# Patient Record
Sex: MALE | Race: WHITE | NOT HISPANIC OR LATINO | Employment: OTHER | URBAN - METROPOLITAN AREA
[De-identification: names, ages, dates, MRNs, and addresses within clinical notes are randomized per-mention and may not be internally consistent; named-entity substitution may affect disease eponyms.]

---

## 2017-12-26 ENCOUNTER — OFFICE VISIT (OUTPATIENT)
Dept: MEDICAL GROUP | Facility: MEDICAL CENTER | Age: 57
End: 2017-12-26
Attending: FAMILY MEDICINE
Payer: MEDICAID

## 2017-12-26 VITALS
HEART RATE: 95 BPM | OXYGEN SATURATION: 96 % | BODY MASS INDEX: 24.65 KG/M2 | HEIGHT: 72 IN | TEMPERATURE: 97.6 F | SYSTOLIC BLOOD PRESSURE: 142 MMHG | DIASTOLIC BLOOD PRESSURE: 104 MMHG | WEIGHT: 182 LBS | RESPIRATION RATE: 18 BRPM

## 2017-12-26 DIAGNOSIS — Z12.11 COLON CANCER SCREENING: ICD-10-CM

## 2017-12-26 DIAGNOSIS — R53.83 OTHER FATIGUE: ICD-10-CM

## 2017-12-26 DIAGNOSIS — R21 RASH: ICD-10-CM

## 2017-12-26 DIAGNOSIS — I10 HYPERTENSION, UNSPECIFIED TYPE: ICD-10-CM

## 2017-12-26 DIAGNOSIS — Z90.79 HISTORY OF ORCHIECTOMY: ICD-10-CM

## 2017-12-26 DIAGNOSIS — Z86.19 HISTORY OF HEPATITIS C: ICD-10-CM

## 2017-12-26 PROCEDURE — 99204 OFFICE O/P NEW MOD 45 MIN: CPT | Performed by: FAMILY MEDICINE

## 2017-12-26 PROCEDURE — 99202 OFFICE O/P NEW SF 15 MIN: CPT | Performed by: FAMILY MEDICINE

## 2017-12-26 RX ORDER — BENZOCAINE/MENTHOL 6 MG-10 MG
LOZENGE MUCOUS MEMBRANE
Qty: 1 TUBE | Refills: 1 | Status: SHIPPED | OUTPATIENT
Start: 2017-12-26 | End: 2019-02-04

## 2017-12-26 RX ORDER — LISINOPRIL 20 MG/1
20 TABLET ORAL DAILY
Qty: 30 TAB | Refills: 6 | Status: SHIPPED | OUTPATIENT
Start: 2017-12-26 | End: 2020-05-01

## 2017-12-26 ASSESSMENT — PATIENT HEALTH QUESTIONNAIRE - PHQ9: CLINICAL INTERPRETATION OF PHQ2 SCORE: 0

## 2017-12-26 NOTE — PROGRESS NOTES
CC: Establish care    HPI:  New patient   Shravan presents today To establish care with PCP, 57 years old male with past medical history significant for hypertension, hepatitis C, history of unilateral orchiectomy for benign testicular tumor, patient did not have PCP for long time because he was in halfway, as part of his establishing care visit reviewed with the patient his past medical problems. Past surgical history, family/social history and medications, he lives with 2 friends/roommates, not working at this time. Did not take his medication for at least 6 months now      Hypertension, unspecified type  Blood pressure is elevated today 142/104. Patient said he feels headache occasionally and he has not been taking his medication because he ran out of them after discharge from halfway. He used to take lisinopril 20 mg daily. Denies chest pain or shortness of breath or lower extremity edema. Requesting refill of his medication to control his blood pressure today    History of hepatitis C   History of hep C, he said he was diagnosed couple of years ago, never got treatment. Would like to explore his options. Denies abdominal pain or jaundice or GI symptoms.    Other fatigue   Reports low energy, fatigue and tiredness without unintentional weight loss or night sweats or fever. Patient said since he was discharged from halfway. He just feels low energy, would like me to check his testosterone because he said he has the history of one testicle removed    History of orchiectomy   Patient with history of benign testicular chronic tumor. He said he has history of orchiectomy that was the long time ago. Would like me to check his testosterone level    Colon cancer screening  No change in bowel habits or abdominal pain     Rash   Reports this rash on his neck area on the right side for the past one month and a half. Patient said it's itchy without pain or burning sensation, no history of shingles. She has been using most  sterilization only without help. Does not remember the name      Patient Active Problem List    Diagnosis Date Noted   • History of orchiectomy 12/26/2017   • History of hepatitis C 12/26/2017   • Hypertension 12/26/2017       Current Outpatient Prescriptions   Medication Sig Dispense Refill   • lisinopril (PRINIVIL) 20 MG Tab Take 1 Tab by mouth every day. 30 Tab 6   • hydrocortisone 1 % Cream As directed 1 Tube 1     No current facility-administered medications for this visit.          Allergies as of 12/26/2017 - Reviewed 12/26/2017   Allergen Reaction Noted   • Bactrim [sulfamethoxazole w-trimethoprim] Swelling 12/26/2017        ROS: Denies any chest pain, Shortness of breath, Changes bowel or bladder, Lower extremity edema.    Physical Exam:  Gen.: Well-developed, well-nourished, no apparent distress,pleasant and cooperative with the examination  Skin:  Warm and dry with good turgor.Rash on the right side of the neck distributed along the dermatome of the right side, not tender. Denies burning sensation or pain. Described as erythematous, raised overlying on his right side of the neck. Patient reports it's very itchy     Eye: PERRLA, conjunctiva and sclera clear, lids normal  HEENT: Normocephalic/atraumatic, sinuses nontender with palpation, TMs clear, nares patent with pink mucosa and clear rhinorrhea, lips without lesions, oropharynx clear.  Neck: Trachea midline,no masses or adenopathy  Thyroid: normal consistency and size. No masses or nodules. Not tender with palpation.  Cor: Regular rate and rhythm without murmur, gallop or rub.  Lungs: Respirations unlabored.Clear to auscultation with equal breath sounds bilaterally. No wheezes, rhonchi.  Abdomen: Soft nontender without hepatosplenomegaly or masses appreciated, normoactive bowel sounds. No hernias.  Extremities: No cyanosis, clubbing or edema, Symmetrical without deformities or malformations. Pulses 2+ and symmetrical both upper and lower  extremities  Lymphatic: No abnormal adenopathy of the neck groin or axillae.  Psych: Alert and oriented x 3.Normal affect, judgement,insight and memory.        Assessment and Plan.   57 y.o. male     1. Hypertension, unspecified type  Restart on lisinopril 20 mg daily. Check lipid profile. Follow-up in 2 weeks  - LIPID PROFILE; Future  - lisinopril (PRINIVIL) 20 MG Tab; Take 1 Tab by mouth every day.  Dispense: 30 Tab; Refill: 6    2. History of hepatitis C  Check liver function test and hepatitis C antibodies, will consider referral for hep C treatment    3. Other fatigue  Will do work up to R/O DM, Anemia , Thyroid disease , Kidney disease    - CBC WITH DIFFERENTIAL; Future  - COMP METABOLIC PANEL; Future  - VITAMIN 1,25 DIHYDROXY; Future  - HEMOGLOBIN A1C; Future  - TSH; Future  - FREE THYROXINE; Future  - TESTOSTERONE, FREE AND TOTAL; Future  - VITAMIN B12; Future    4. History of orchiectomy  Check testosterone level  - TESTOSTERONE, FREE AND TOTAL; Future    5. Colon cancer screening    - OCCULT BLOOD FECES IMMUNOASSAY; Future    6. Rash  Unlikely related to shingles because of absence of tenderness or pain or burning sensation, possibly allergic in origin. Advised to use hydrocortisone cream and follow-up with me if not improved.  - hydrocortisone 1 % Cream; As directed  Dispense: 1 Tube; Refill: 1      Please note that this dictation was created using voice recognition software. I have made every reasonable attempt to correct obvious errors but there may be errors of grammar and content that I may have overlooked prior to finalization of this note.

## 2019-02-04 DIAGNOSIS — Z01.810 PRE-OPERATIVE CARDIOVASCULAR EXAMINATION: ICD-10-CM

## 2019-02-04 DIAGNOSIS — Z01.812 PRE-OPERATIVE LABORATORY EXAMINATION: ICD-10-CM

## 2019-02-04 LAB
ANION GAP SERPL CALC-SCNC: 6 MMOL/L (ref 0–11.9)
BASOPHILS # BLD AUTO: 0.6 % (ref 0–1.8)
BASOPHILS # BLD: 0.04 K/UL (ref 0–0.12)
BUN SERPL-MCNC: 25 MG/DL (ref 8–22)
CALCIUM SERPL-MCNC: 9.2 MG/DL (ref 8.5–10.5)
CHLORIDE SERPL-SCNC: 105 MMOL/L (ref 96–112)
CO2 SERPL-SCNC: 28 MMOL/L (ref 20–33)
CREAT SERPL-MCNC: 0.97 MG/DL (ref 0.5–1.4)
EKG IMPRESSION: NORMAL
EOSINOPHIL # BLD AUTO: 0.31 K/UL (ref 0–0.51)
EOSINOPHIL NFR BLD: 4.7 % (ref 0–6.9)
ERYTHROCYTE [DISTWIDTH] IN BLOOD BY AUTOMATED COUNT: 44.1 FL (ref 35.9–50)
GLUCOSE SERPL-MCNC: 127 MG/DL (ref 65–99)
HCT VFR BLD AUTO: 40.7 % (ref 42–52)
HGB BLD-MCNC: 13.9 G/DL (ref 14–18)
IMM GRANULOCYTES # BLD AUTO: 0.03 K/UL (ref 0–0.11)
IMM GRANULOCYTES NFR BLD AUTO: 0.5 % (ref 0–0.9)
LYMPHOCYTES # BLD AUTO: 2.38 K/UL (ref 1–4.8)
LYMPHOCYTES NFR BLD: 36 % (ref 22–41)
MCH RBC QN AUTO: 33.3 PG (ref 27–33)
MCHC RBC AUTO-ENTMCNC: 34.2 G/DL (ref 33.7–35.3)
MCV RBC AUTO: 97.4 FL (ref 81.4–97.8)
MONOCYTES # BLD AUTO: 0.71 K/UL (ref 0–0.85)
MONOCYTES NFR BLD AUTO: 10.7 % (ref 0–13.4)
NEUTROPHILS # BLD AUTO: 3.14 K/UL (ref 1.82–7.42)
NEUTROPHILS NFR BLD: 47.5 % (ref 44–72)
NRBC # BLD AUTO: 0 K/UL
NRBC BLD-RTO: 0 /100 WBC
PLATELET # BLD AUTO: 212 K/UL (ref 164–446)
PMV BLD AUTO: 8.8 FL (ref 9–12.9)
POTASSIUM SERPL-SCNC: 4.2 MMOL/L (ref 3.6–5.5)
RBC # BLD AUTO: 4.18 M/UL (ref 4.7–6.1)
SODIUM SERPL-SCNC: 139 MMOL/L (ref 135–145)
WBC # BLD AUTO: 6.6 K/UL (ref 4.8–10.8)

## 2019-02-04 PROCEDURE — 80048 BASIC METABOLIC PNL TOTAL CA: CPT

## 2019-02-04 PROCEDURE — 85025 COMPLETE CBC W/AUTO DIFF WBC: CPT

## 2019-02-04 PROCEDURE — 93010 ELECTROCARDIOGRAM REPORT: CPT | Performed by: INTERNAL MEDICINE

## 2019-02-04 PROCEDURE — 36415 COLL VENOUS BLD VENIPUNCTURE: CPT

## 2019-02-04 PROCEDURE — 93005 ELECTROCARDIOGRAM TRACING: CPT

## 2019-02-05 ENCOUNTER — HOSPITAL ENCOUNTER (OUTPATIENT)
Facility: MEDICAL CENTER | Age: 59
End: 2019-02-05
Attending: SURGERY | Admitting: SURGERY
Payer: MEDICAID

## 2019-02-05 VITALS
RESPIRATION RATE: 16 BRPM | HEART RATE: 69 BPM | WEIGHT: 179.01 LBS | OXYGEN SATURATION: 97 % | BODY MASS INDEX: 24.25 KG/M2 | HEIGHT: 72 IN | TEMPERATURE: 98.2 F

## 2019-02-05 DIAGNOSIS — K40.90 NON-RECURRENT UNILATERAL INGUINAL HERNIA WITHOUT OBSTRUCTION OR GANGRENE: ICD-10-CM

## 2019-02-05 PROCEDURE — 160046 HCHG PACU - 1ST 60 MINS PHASE II: Performed by: SURGERY

## 2019-02-05 PROCEDURE — 160036 HCHG PACU - EA ADDL 30 MINS PHASE I: Performed by: SURGERY

## 2019-02-05 PROCEDURE — A9270 NON-COVERED ITEM OR SERVICE: HCPCS | Performed by: ANESTHESIOLOGY

## 2019-02-05 PROCEDURE — 500380 HCHG DRAIN, PENROSE 1/4X12: Performed by: SURGERY

## 2019-02-05 PROCEDURE — 700102 HCHG RX REV CODE 250 W/ 637 OVERRIDE(OP): Performed by: ANESTHESIOLOGY

## 2019-02-05 PROCEDURE — 160028 HCHG SURGERY MINUTES - 1ST 30 MINS LEVEL 3: Performed by: SURGERY

## 2019-02-05 PROCEDURE — A6402 STERILE GAUZE <= 16 SQ IN: HCPCS | Performed by: SURGERY

## 2019-02-05 PROCEDURE — 160009 HCHG ANES TIME/MIN: Performed by: SURGERY

## 2019-02-05 PROCEDURE — C1781 MESH (IMPLANTABLE): HCPCS | Performed by: SURGERY

## 2019-02-05 PROCEDURE — 700111 HCHG RX REV CODE 636 W/ 250 OVERRIDE (IP)

## 2019-02-05 PROCEDURE — 160025 RECOVERY II MINUTES (STATS): Performed by: SURGERY

## 2019-02-05 PROCEDURE — 160002 HCHG RECOVERY MINUTES (STAT): Performed by: SURGERY

## 2019-02-05 PROCEDURE — 160039 HCHG SURGERY MINUTES - EA ADDL 1 MIN LEVEL 3: Performed by: SURGERY

## 2019-02-05 PROCEDURE — 160047 HCHG PACU  - EA ADDL 30 MINS PHASE II: Performed by: SURGERY

## 2019-02-05 PROCEDURE — 160035 HCHG PACU - 1ST 60 MINS PHASE I: Performed by: SURGERY

## 2019-02-05 PROCEDURE — 700111 HCHG RX REV CODE 636 W/ 250 OVERRIDE (IP): Performed by: ANESTHESIOLOGY

## 2019-02-05 PROCEDURE — 501838 HCHG SUTURE GENERAL: Performed by: SURGERY

## 2019-02-05 PROCEDURE — 160048 HCHG OR STATISTICAL LEVEL 1-5: Performed by: SURGERY

## 2019-02-05 PROCEDURE — 700101 HCHG RX REV CODE 250

## 2019-02-05 DEVICE — MESH ULTRAPRO HERNIA 7.5CM - (3/BX): Type: IMPLANTABLE DEVICE | Site: ABDOMEN | Status: FUNCTIONAL

## 2019-02-05 RX ORDER — OXYCODONE HCL 5 MG/5 ML
10 SOLUTION, ORAL ORAL
Status: COMPLETED | OUTPATIENT
Start: 2019-02-05 | End: 2019-02-05

## 2019-02-05 RX ORDER — BUPIVACAINE HYDROCHLORIDE AND EPINEPHRINE 5; 5 MG/ML; UG/ML
INJECTION, SOLUTION EPIDURAL; INTRACAUDAL; PERINEURAL
Status: DISCONTINUED | OUTPATIENT
Start: 2019-02-05 | End: 2019-02-05 | Stop reason: HOSPADM

## 2019-02-05 RX ORDER — ONDANSETRON 2 MG/ML
4 INJECTION INTRAMUSCULAR; INTRAVENOUS
Status: DISCONTINUED | OUTPATIENT
Start: 2019-02-05 | End: 2019-02-05 | Stop reason: HOSPADM

## 2019-02-05 RX ORDER — IPRATROPIUM BROMIDE AND ALBUTEROL SULFATE 2.5; .5 MG/3ML; MG/3ML
3 SOLUTION RESPIRATORY (INHALATION)
Status: DISCONTINUED | OUTPATIENT
Start: 2019-02-05 | End: 2019-02-05 | Stop reason: HOSPADM

## 2019-02-05 RX ORDER — DIPHENHYDRAMINE HYDROCHLORIDE 50 MG/ML
12.5 INJECTION INTRAMUSCULAR; INTRAVENOUS
Status: DISCONTINUED | OUTPATIENT
Start: 2019-02-05 | End: 2019-02-05 | Stop reason: HOSPADM

## 2019-02-05 RX ORDER — HYDROCODONE BITARTRATE AND ACETAMINOPHEN 5; 325 MG/1; MG/1
1-2 TABLET ORAL EVERY 6 HOURS PRN
Qty: 20 TAB | Refills: 0 | Status: SHIPPED | OUTPATIENT
Start: 2019-02-05 | End: 2019-02-12

## 2019-02-05 RX ORDER — OXYCODONE HCL 5 MG/5 ML
5 SOLUTION, ORAL ORAL
Status: COMPLETED | OUTPATIENT
Start: 2019-02-05 | End: 2019-02-05

## 2019-02-05 RX ORDER — HYDROCODONE BITARTRATE AND ACETAMINOPHEN 5; 325 MG/1; MG/1
1-2 TABLET ORAL EVERY 6 HOURS PRN
Status: DISCONTINUED | OUTPATIENT
Start: 2019-02-05 | End: 2019-02-05 | Stop reason: HOSPADM

## 2019-02-05 RX ORDER — MEPERIDINE HYDROCHLORIDE 25 MG/ML
12.5 INJECTION INTRAMUSCULAR; INTRAVENOUS; SUBCUTANEOUS
Status: DISCONTINUED | OUTPATIENT
Start: 2019-02-05 | End: 2019-02-05 | Stop reason: HOSPADM

## 2019-02-05 RX ORDER — HALOPERIDOL 5 MG/ML
1 INJECTION INTRAMUSCULAR
Status: DISCONTINUED | OUTPATIENT
Start: 2019-02-05 | End: 2019-02-05 | Stop reason: HOSPADM

## 2019-02-05 RX ORDER — SODIUM CHLORIDE, SODIUM LACTATE, POTASSIUM CHLORIDE, CALCIUM CHLORIDE 600; 310; 30; 20 MG/100ML; MG/100ML; MG/100ML; MG/100ML
INJECTION, SOLUTION INTRAVENOUS ONCE
Status: COMPLETED | OUTPATIENT
Start: 2019-02-05 | End: 2019-02-05

## 2019-02-05 RX ORDER — HYDROMORPHONE HYDROCHLORIDE 1 MG/ML
0.1 INJECTION, SOLUTION INTRAMUSCULAR; INTRAVENOUS; SUBCUTANEOUS
Status: DISCONTINUED | OUTPATIENT
Start: 2019-02-05 | End: 2019-02-05 | Stop reason: HOSPADM

## 2019-02-05 RX ORDER — MIDAZOLAM HYDROCHLORIDE 1 MG/ML
1 INJECTION INTRAMUSCULAR; INTRAVENOUS
Status: DISCONTINUED | OUTPATIENT
Start: 2019-02-05 | End: 2019-02-05 | Stop reason: HOSPADM

## 2019-02-05 RX ORDER — HYDROMORPHONE HYDROCHLORIDE 1 MG/ML
0.4 INJECTION, SOLUTION INTRAMUSCULAR; INTRAVENOUS; SUBCUTANEOUS
Status: DISCONTINUED | OUTPATIENT
Start: 2019-02-05 | End: 2019-02-05 | Stop reason: HOSPADM

## 2019-02-05 RX ORDER — SODIUM CHLORIDE, SODIUM GLUCONATE, SODIUM ACETATE, POTASSIUM CHLORIDE AND MAGNESIUM CHLORIDE 526; 502; 368; 37; 30 MG/100ML; MG/100ML; MG/100ML; MG/100ML; MG/100ML
500 INJECTION, SOLUTION INTRAVENOUS CONTINUOUS
Status: DISCONTINUED | OUTPATIENT
Start: 2019-02-05 | End: 2019-02-05 | Stop reason: HOSPADM

## 2019-02-05 RX ORDER — HYDROMORPHONE HYDROCHLORIDE 1 MG/ML
0.2 INJECTION, SOLUTION INTRAMUSCULAR; INTRAVENOUS; SUBCUTANEOUS
Status: DISCONTINUED | OUTPATIENT
Start: 2019-02-05 | End: 2019-02-05 | Stop reason: HOSPADM

## 2019-02-05 RX ADMIN — SODIUM CHLORIDE, SODIUM LACTATE, POTASSIUM CHLORIDE, CALCIUM CHLORIDE: 600; 310; 30; 20 INJECTION, SOLUTION INTRAVENOUS at 11:35

## 2019-02-05 RX ADMIN — OXYCODONE HYDROCHLORIDE 10 MG: 5 SOLUTION ORAL at 15:38

## 2019-02-05 RX ADMIN — FENTANYL CITRATE 50 MCG: 50 INJECTION, SOLUTION INTRAMUSCULAR; INTRAVENOUS at 15:37

## 2019-02-05 NOTE — PROGRESS NOTES
1521-Pt in PACU from OR, report from OR RN and Dr. Gurrola. VSS. Pt on room air. Right groin dressing CDI, gauze and tegaderm. Pt denies pain. Cold pack placed to right groin.    1620-Pt sitting up in gurney, sipping on water. Pt's ride Alexandre not present in waiting room. VM left at phone number provided for Alexandre.     1633-Pt up to restroom to attempt to void and to get dressed.     1645-Pt dressed, voiding without difficulty, ready to go home. Pt's ride Alexandre not answering. Pt attempting to call other friends for a ride. Pt resting in a recliner waiting for a ride.     1710-Pt able to get a hold of an alternate ride, waiting for ride.     1735-Pt discharged home with friend Deejay. Ambulatory with steady gait.

## 2019-02-05 NOTE — OP REPORT
DATE OF OPERATION: 2/5/2019     PREOPERATIVE DIAGNOSIS: Right inguinal hernia.     POSTOPERATIVE DIAGNOSIS: Right indirect inguinal hernia.     PROCEDURE PERFORMED: Repair of right indirect inguinal hernia.     SURGEON: Jh Rowe M.D.    ASSISTANT: HOMERO Deutsch.    ANESTHESIOLOGIST: Jules Gurrola M.D..    ASA CLASSIFICATION: II.    ANESTHESIA: Laryngeal mask anesthesia.     INDICATIONS: The patient is a 59 y.o. male with a symptomatic right inguinal hernia. He  is taken to the operating room for repair.     FINDINGS: Indirect inguinal hernia.    WOUND CLASSIFICATION: Class I, Clean.     SPECIMEN: None.    ESTIMATED BLOOD LOSS: 5 mL.    PROCEDURE: Following informed consent, the patient was properly identified, taken to the operating room, and placed in a supine position where a general endotracheal anesthesia was administered. Intravenous antibiotics were administered by the anesthesiologist in the correct time interval. Sequential compression devices were employed. The correct operative site was widely prepped into a sterile field.     Marcaine 0.5% with epinephrine was used to administer an ilioinguinal nerve field block. An oblique groin incision was made. The subcutaneous tissues were divided with electrocautery. The external abdominal oblique fibers were opened. The underlying cord structures were identified, encircled with a Penrose drain, and fully mobilized.     An indirect inguinal hernia sac was identified in its anteromedial location. The hernia sack was carefully dissected free from the cord structures and reduced into the preperitoneal space. A medium Ethicon ULTRAPRO™ Hernia System mesh prosthesis was passed onto the operative field. The underlay patch was passed through the internal ring and deployed in the preperitoneal space. The outer onlay mesh was secured medially and inferiorly at the pubic tubercle and Benito's ligament with interrupted 0 Ethibond sutures. The inferior portion of  the outer onlay mesh was secured about the cord structures with an 0 Ethibond suture.    The external abdominal oblique fascia was approximated over the repair with a running 4-0 Vicryl suture. The soft tissues were approximated with interrupted 3-0 Vicryl sutures and the skin approximated with a running 4-0 Monocryl suture. Sterile dressings were applied.      The patient tolerated the procedure well. There were no apparent complications. All sponge, needle, and instrument counts were correct on two separate occasions. He was awakened, extubated, and transferred to the recovery room in satisfactory condition.   ____________________________________   Jh Rowe M.D.     DD: 2/5/2019  3:00 PM

## 2019-02-06 NOTE — DISCHARGE INSTRUCTIONS
ACTIVITY: Rest and take it easy for the first 24 hours.  A responsible adult is recommended to remain with you during that time.  It is normal to feel sleepy.  We encourage you to not do anything that requires balance, judgment or coordination.    MILD FLU-LIKE SYMPTOMS ARE NORMAL. YOU MAY EXPERIENCE GENERALIZED MUSCLE ACHES, THROAT IRRITATION, HEADACHE AND/OR SOME NAUSEA.    FOR 24 HOURS DO NOT:  Drive, operate machinery or run household appliances.  Drink beer or alcoholic beverages.   Make important decisions or sign legal documents.    SPECIAL INSTRUCTIONS:     Discharge instructions:    1. DIET: Upon discharge from the hospital you may resume your normal preoperative diet. Depending on how you are feeling and whether you have nausea or not, you may wish to stay with a bland diet for the first few days. However, you can advance this as quickly as you feel ready.    2. ACTIVITIES: After discharge from the hospital, you may resume full routine activities. However, there should be no heavy lifting (greater than 15 pounds) and no strenuous activities until after your follow-up visit. Otherwise, routine activities of daily living are acceptable.    3. DRIVING: You may drive whenever you are off pain medications and are able to perform the activities needed to drive, i.e. turning, bending, twisting, etc.    4. BATHING: You may get the wound wet at any time after leaving the hospital. You may shower, but do not submerge in a bath for at least a week. Dressings may come off after 48 hours.    5. BOWEL FUNCTION: Constipation is common after an operation, especially with pain medications. The combination of pain medication and decreased activity level can cause constipation in otherwise normal patients. If you feel this is occurring, take a laxative (Milk of Magnesia, Ex-Lax, Senokot, etc.) until the problem has resolved.    6. PAIN MEDICATION: You will be given a prescription for pain medication at discharge. Please  take these as directed. It is important to remember not to take medications on an empty stomach as this may cause nausea.    7.CALL IF YOU HAVE: (1) Fevers to more than 1010 F, (2) Unusual chest or leg pain, (3) Drainage or fluid from incision that may be foul smelling, increased tenderness or soreness at the wound or the wound edges are no longer together, redness or swelling at the incision site. Please do not hesitate to call with any other questions.     8. APPOINTMENT: Contact our office at [phone] for a follow-up appointment in 1-2 weeks following your procedure.    If you have any additional questions, please do not hesitate to call the office and speak to either myself or the physician on call.    Office address:  [address] [place] [id number]      Jh Rowe MD  [facility]  [phone]      DIET: To avoid nausea, slowly advance diet as tolerated, avoiding spicy or greasy foods for the first day.  Add more substantial food to your diet according to your physician's instructions.  Babies can be fed formula or breast milk as soon as they are hungry.  INCREASE FLUIDS AND FIBER TO AVOID CONSTIPATION.    SURGICAL DRESSING/BATHING: No bath tubs, hot tubs or swimming until cleared by your physician.     FOLLOW-UP APPOINTMENT:  A follow-up appointment should be arranged with your doctor; call to schedule.    You should CALL YOUR PHYSICIAN if you develop:  Fever greater than 101 degrees F.  Pain not relieved by medication, or persistent nausea or vomiting.  Excessive bleeding (blood soaking through dressing) or unexpected drainage from the wound.  Extreme redness or swelling around the incision site, drainage of pus or foul smelling drainage.  Inability to urinate or empty your bladder within 8 hours.  Problems with breathing or chest pain.    You should call 911 if you develop problems with breathing or chest pain.  If you are unable to contact your doctor or surgical center, you  should go to the nearest emergency room or urgent care center.  Physician's telephone #: [phone].    If any questions arise, call your doctor.  If your doctor is not available, please feel free to call the Surgical Center at [phone].  The Center is open Monday through Friday from 7AM to 7PM.  You can also call the HEALTH HOTLINE open 24 hours/day, 7 days/week and speak to a nurse at [phone], or toll free at [phone].    A registered nurse may call you a few days after your surgery to see how you are doing after your procedure.    MEDICATIONS: Resume taking daily medication.  Take prescribed pain medication with food.  If no medication is prescribed, you may take non-aspirin pain medication if needed.  PAIN MEDICATION CAN BE VERY CONSTIPATING.  Take a stool softener or laxative such as senokot, pericolace, or milk of magnesia if needed.    Prescription given for Norco.  Last pain medication given at 3:38PM, next at 7:38PM.    If your physician has prescribed pain medication that includes Acetaminophen (Tylenol), do not take additional Acetaminophen (Tylenol) while taking the prescribed medication.    Depression / Suicide Risk    As you are discharged from this [facility] facility, it is important to learn how to keep safe from harming yourself.    Recognize the warning signs:  · Abrupt changes in personality, positive or negative- including increase in energy   · Giving away possessions  · Change in eating patterns- significant weight changes-  positive or negative  · Change in sleeping patterns- unable to sleep or sleeping all the time   · Unwillingness or inability to communicate  · Depression  · Unusual sadness, discouragement and loneliness  · Talk of wanting to die  · Neglect of personal appearance   · Rebelliousness- reckless behavior  · Withdrawal from people/activities they love  · Confusion- inability to concentrate     If you or a loved one observes any of these behaviors or has  concerns about self-harm, here's what you can do:  · Talk about it- your feelings and reasons for harming yourself  · Remove any means that you might use to hurt yourself (examples: pills, rope, extension cords, firearm)  · Get professional help from the community (Mental Health, Substance Abuse, psychological counseling)  · Do not be alone:Call your Safe Contact- someone whom you trust who will be there for you.  · Call your local CRISIS HOTLINE 740-5054 or 830-748-6915  · Call your local Children's Mobile Crisis Response Team [place] (056) 061-7696 or www.[employer]  · Call the toll free National Suicide Prevention Hotlines   · National Suicide Prevention Lifeline 872-249-EEYO (5758)  · National Hope Line Network 800-SUICIDE (034-2050)

## 2019-04-17 ENCOUNTER — HOSPITAL ENCOUNTER (EMERGENCY)
Dept: HOSPITAL 8 - ED | Age: 59
Discharge: HOME | End: 2019-04-17
Payer: MEDICAID

## 2019-04-17 VITALS — WEIGHT: 172.18 LBS | HEIGHT: 72 IN | BODY MASS INDEX: 23.32 KG/M2

## 2019-04-17 VITALS — SYSTOLIC BLOOD PRESSURE: 148 MMHG | DIASTOLIC BLOOD PRESSURE: 101 MMHG

## 2019-04-17 DIAGNOSIS — K52.29: Primary | ICD-10-CM

## 2019-04-17 DIAGNOSIS — I10: ICD-10-CM

## 2019-04-17 LAB
ALBUMIN SERPL-MCNC: 3.9 G/DL (ref 3.4–5)
ALP SERPL-CCNC: 72 U/L (ref 45–117)
ALT SERPL-CCNC: 46 U/L (ref 12–78)
ANION GAP SERPL CALC-SCNC: 6 MMOL/L (ref 5–15)
BASOPHILS # BLD AUTO: 0.01 X10^3/UL (ref 0–0.1)
BASOPHILS NFR BLD AUTO: 0 % (ref 0–1)
BILIRUB SERPL-MCNC: 0.6 MG/DL (ref 0.2–1)
CALCIUM SERPL-MCNC: 8.5 MG/DL (ref 8.5–10.1)
CHLORIDE SERPL-SCNC: 107 MMOL/L (ref 98–107)
CREAT SERPL-MCNC: 1.05 MG/DL (ref 0.7–1.3)
CULTURE INDICATED?: NO
EOSINOPHIL # BLD AUTO: 0.14 X10^3/UL (ref 0–0.4)
EOSINOPHIL NFR BLD AUTO: 1 % (ref 1–7)
ERYTHROCYTE [DISTWIDTH] IN BLOOD BY AUTOMATED COUNT: 12.8 % (ref 9.4–14.8)
LYMPHOCYTES # BLD AUTO: 0.54 X10^3/UL (ref 1–3.4)
LYMPHOCYTES NFR BLD AUTO: 6 % (ref 22–44)
MCH RBC QN AUTO: 32.6 PG (ref 27.5–34.5)
MCHC RBC AUTO-ENTMCNC: 33.7 G/DL (ref 33.2–36.2)
MCV RBC AUTO: 96.5 FL (ref 81–97)
MD: NO
MICROSCOPIC: (no result)
MONOCYTES # BLD AUTO: 0.48 X10^3/UL (ref 0.2–0.8)
MONOCYTES NFR BLD AUTO: 5 % (ref 2–9)
NEUTROPHILS # BLD AUTO: 8.48 X10^3/UL (ref 1.8–6.8)
NEUTROPHILS NFR BLD AUTO: 88 % (ref 42–75)
PLATELET # BLD AUTO: 206 X10^3/UL (ref 130–400)
PMV BLD AUTO: 7.2 FL (ref 7.4–10.4)
PROT SERPL-MCNC: 7.8 G/DL (ref 6.4–8.2)
RBC # BLD AUTO: 4.29 X10^6/UL (ref 4.38–5.82)

## 2019-04-17 PROCEDURE — 80053 COMPREHEN METABOLIC PANEL: CPT

## 2019-04-17 PROCEDURE — 36415 COLL VENOUS BLD VENIPUNCTURE: CPT

## 2019-04-17 PROCEDURE — 96374 THER/PROPH/DIAG INJ IV PUSH: CPT

## 2019-04-17 PROCEDURE — 83690 ASSAY OF LIPASE: CPT

## 2019-04-17 PROCEDURE — 85025 COMPLETE CBC W/AUTO DIFF WBC: CPT

## 2019-04-17 PROCEDURE — 74177 CT ABD & PELVIS W/CONTRAST: CPT

## 2019-04-17 PROCEDURE — 99284 EMERGENCY DEPT VISIT MOD MDM: CPT

## 2019-04-17 PROCEDURE — 96375 TX/PRO/DX INJ NEW DRUG ADDON: CPT

## 2019-04-17 PROCEDURE — 81003 URINALYSIS AUTO W/O SCOPE: CPT

## 2019-04-17 NOTE — NUR
SBAR REPORT FROM SÁNCHEZ HOOVER. PT AMBULATORY TO BATHROOM WITH STEADY GAIT FOR URINE 
SAMPLE. ALL OTHER CONCERNS ADRESSED.

## 2019-04-17 NOTE — NUR
Patient/Caregiver given discharge instructions and they have confirmed that 
they understand the instructions.  Patient ambulatory with steady gait. Pt left 
with all personal belongings.

## 2019-07-30 ENCOUNTER — HOSPITAL ENCOUNTER (EMERGENCY)
Dept: HOSPITAL 8 - ED | Age: 59
Discharge: HOME | End: 2019-07-30
Payer: MEDICAID

## 2019-07-30 VITALS — SYSTOLIC BLOOD PRESSURE: 166 MMHG | DIASTOLIC BLOOD PRESSURE: 95 MMHG

## 2019-07-30 VITALS — WEIGHT: 167.99 LBS | HEIGHT: 72 IN | BODY MASS INDEX: 22.75 KG/M2

## 2019-07-30 DIAGNOSIS — S00.462A: Primary | ICD-10-CM
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
## 2019-07-30 DIAGNOSIS — H60.12: ICD-10-CM

## 2019-07-30 DIAGNOSIS — I10: ICD-10-CM
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
## 2019-07-30 PROCEDURE — 99283 EMERGENCY DEPT VISIT LOW MDM: CPT

## 2020-05-01 ENCOUNTER — APPOINTMENT (OUTPATIENT)
Dept: RADIOLOGY | Facility: MEDICAL CENTER | Age: 60
DRG: 948 | End: 2020-05-01
Attending: EMERGENCY MEDICINE
Payer: MEDICAID

## 2020-05-01 ENCOUNTER — HOSPITAL ENCOUNTER (INPATIENT)
Facility: MEDICAL CENTER | Age: 60
LOS: 1 days | DRG: 948 | End: 2020-05-03
Attending: EMERGENCY MEDICINE | Admitting: INTERNAL MEDICINE
Payer: MEDICAID

## 2020-05-01 DIAGNOSIS — R79.89 ELEVATED TROPONIN: ICD-10-CM

## 2020-05-01 DIAGNOSIS — R07.9 ACUTE CHEST PAIN: ICD-10-CM

## 2020-05-01 DIAGNOSIS — F19.90 DRUG USE: ICD-10-CM

## 2020-05-01 DIAGNOSIS — R55 SYNCOPE, UNSPECIFIED SYNCOPE TYPE: ICD-10-CM

## 2020-05-01 DIAGNOSIS — R06.00 DYSPNEA, UNSPECIFIED TYPE: ICD-10-CM

## 2020-05-01 LAB
ALBUMIN SERPL BCP-MCNC: 4.1 G/DL (ref 3.2–4.9)
ALBUMIN/GLOB SERPL: 1.2 G/DL
ALP SERPL-CCNC: 67 U/L (ref 30–99)
ALT SERPL-CCNC: 31 U/L (ref 2–50)
ANION GAP SERPL CALC-SCNC: 11 MMOL/L (ref 7–16)
AST SERPL-CCNC: 37 U/L (ref 12–45)
BASOPHILS # BLD AUTO: 0.6 % (ref 0–1.8)
BASOPHILS # BLD: 0.03 K/UL (ref 0–0.12)
BILIRUB SERPL-MCNC: 0.4 MG/DL (ref 0.1–1.5)
BUN SERPL-MCNC: 25 MG/DL (ref 8–22)
CALCIUM SERPL-MCNC: 8.9 MG/DL (ref 8.5–10.5)
CHLORIDE SERPL-SCNC: 105 MMOL/L (ref 96–112)
CO2 SERPL-SCNC: 26 MMOL/L (ref 20–33)
COVID ORDER STATUS COVID19: NORMAL
CREAT SERPL-MCNC: 1.01 MG/DL (ref 0.5–1.4)
D DIMER PPP IA.FEU-MCNC: <0.27 UG/ML (FEU) (ref 0–0.5)
EOSINOPHIL # BLD AUTO: 0.34 K/UL (ref 0–0.51)
EOSINOPHIL NFR BLD: 6.5 % (ref 0–6.9)
ERYTHROCYTE [DISTWIDTH] IN BLOOD BY AUTOMATED COUNT: 42.2 FL (ref 35.9–50)
ETHANOL BLD-MCNC: <10.1 MG/DL (ref 0–10.1)
GLOBULIN SER CALC-MCNC: 3.4 G/DL (ref 1.9–3.5)
GLUCOSE SERPL-MCNC: 128 MG/DL (ref 65–99)
HCT VFR BLD AUTO: 37.9 % (ref 42–52)
HGB BLD-MCNC: 13.2 G/DL (ref 14–18)
IMM GRANULOCYTES # BLD AUTO: 0.02 K/UL (ref 0–0.11)
IMM GRANULOCYTES NFR BLD AUTO: 0.4 % (ref 0–0.9)
LIPASE SERPL-CCNC: 29 U/L (ref 11–82)
LYMPHOCYTES # BLD AUTO: 2 K/UL (ref 1–4.8)
LYMPHOCYTES NFR BLD: 38.3 % (ref 22–41)
MAGNESIUM SERPL-MCNC: 2.2 MG/DL (ref 1.5–2.5)
MCH RBC QN AUTO: 32.8 PG (ref 27–33)
MCHC RBC AUTO-ENTMCNC: 34.8 G/DL (ref 33.7–35.3)
MCV RBC AUTO: 94.3 FL (ref 81.4–97.8)
MONOCYTES # BLD AUTO: 0.47 K/UL (ref 0–0.85)
MONOCYTES NFR BLD AUTO: 9 % (ref 0–13.4)
NEUTROPHILS # BLD AUTO: 2.36 K/UL (ref 1.82–7.42)
NEUTROPHILS NFR BLD: 45.2 % (ref 44–72)
NRBC # BLD AUTO: 0 K/UL
NRBC BLD-RTO: 0 /100 WBC
PLATELET # BLD AUTO: 192 K/UL (ref 164–446)
PMV BLD AUTO: 8.8 FL (ref 9–12.9)
POTASSIUM SERPL-SCNC: 4 MMOL/L (ref 3.6–5.5)
PROT SERPL-MCNC: 7.5 G/DL (ref 6–8.2)
RBC # BLD AUTO: 4.02 M/UL (ref 4.7–6.1)
SODIUM SERPL-SCNC: 142 MMOL/L (ref 135–145)
TROPONIN T SERPL-MCNC: 24 NG/L (ref 6–19)
WBC # BLD AUTO: 5.2 K/UL (ref 4.8–10.8)

## 2020-05-01 PROCEDURE — 71045 X-RAY EXAM CHEST 1 VIEW: CPT

## 2020-05-01 PROCEDURE — 99285 EMERGENCY DEPT VISIT HI MDM: CPT

## 2020-05-01 PROCEDURE — 85379 FIBRIN DEGRADATION QUANT: CPT

## 2020-05-01 PROCEDURE — 83690 ASSAY OF LIPASE: CPT

## 2020-05-01 PROCEDURE — 83735 ASSAY OF MAGNESIUM: CPT

## 2020-05-01 PROCEDURE — G2023 SPECIMEN COLLECT COVID-19: HCPCS | Performed by: EMERGENCY MEDICINE

## 2020-05-01 PROCEDURE — U0004 COV-19 TEST NON-CDC HGH THRU: HCPCS

## 2020-05-01 PROCEDURE — 93005 ELECTROCARDIOGRAM TRACING: CPT | Performed by: EMERGENCY MEDICINE

## 2020-05-01 PROCEDURE — 80307 DRUG TEST PRSMV CHEM ANLYZR: CPT

## 2020-05-01 PROCEDURE — 80053 COMPREHEN METABOLIC PANEL: CPT

## 2020-05-01 PROCEDURE — 84484 ASSAY OF TROPONIN QUANT: CPT

## 2020-05-01 PROCEDURE — 85025 COMPLETE CBC W/AUTO DIFF WBC: CPT

## 2020-05-02 PROBLEM — I16.0 HYPERTENSIVE URGENCY: Status: ACTIVE | Noted: 2020-05-02

## 2020-05-02 PROBLEM — R07.9 CHEST PAIN: Status: ACTIVE | Noted: 2020-05-02

## 2020-05-02 PROBLEM — F15.10 AMPHETAMINE ABUSE (HCC): Status: ACTIVE | Noted: 2020-05-02

## 2020-05-02 PROBLEM — D64.9 NORMOCYTIC ANEMIA: Status: ACTIVE | Noted: 2020-05-02

## 2020-05-02 LAB
AMPHET UR QL SCN: POSITIVE
BARBITURATES UR QL SCN: NEGATIVE
BENZODIAZ UR QL SCN: NEGATIVE
BZE UR QL SCN: NEGATIVE
CANNABINOIDS UR QL SCN: NEGATIVE
EKG IMPRESSION: NORMAL
METHADONE UR QL SCN: NEGATIVE
OPIATES UR QL SCN: POSITIVE
OXYCODONE UR QL SCN: NEGATIVE
PCP UR QL SCN: NEGATIVE
PROCALCITONIN SERPL-MCNC: 0.13 NG/ML
PROPOXYPH UR QL SCN: NEGATIVE
SARS-COV-2 RNA RESP QL NAA+PROBE: NOTDETECTED
SPECIMEN SOURCE: NORMAL
TROPONIN T SERPL-MCNC: 36 NG/L (ref 6–19)

## 2020-05-02 PROCEDURE — 770020 HCHG ROOM/CARE - TELE (206)

## 2020-05-02 PROCEDURE — 700105 HCHG RX REV CODE 258: Performed by: HOSPITALIST

## 2020-05-02 PROCEDURE — 84145 PROCALCITONIN (PCT): CPT

## 2020-05-02 PROCEDURE — 99220 PR INITIAL OBSERVATION CARE,LEVL III: CPT | Performed by: HOSPITALIST

## 2020-05-02 PROCEDURE — 84484 ASSAY OF TROPONIN QUANT: CPT

## 2020-05-02 PROCEDURE — A9270 NON-COVERED ITEM OR SERVICE: HCPCS | Performed by: HOSPITALIST

## 2020-05-02 PROCEDURE — 700111 HCHG RX REV CODE 636 W/ 250 OVERRIDE (IP): Performed by: HOSPITALIST

## 2020-05-02 PROCEDURE — 700102 HCHG RX REV CODE 250 W/ 637 OVERRIDE(OP): Performed by: HOSPITALIST

## 2020-05-02 PROCEDURE — 36415 COLL VENOUS BLD VENIPUNCTURE: CPT

## 2020-05-02 PROCEDURE — 80307 DRUG TEST PRSMV CHEM ANLYZR: CPT

## 2020-05-02 RX ORDER — ONDANSETRON 2 MG/ML
4 INJECTION INTRAMUSCULAR; INTRAVENOUS EVERY 4 HOURS PRN
Status: DISCONTINUED | OUTPATIENT
Start: 2020-05-02 | End: 2020-05-03 | Stop reason: HOSPADM

## 2020-05-02 RX ORDER — SODIUM CHLORIDE, SODIUM LACTATE, POTASSIUM CHLORIDE, CALCIUM CHLORIDE 600; 310; 30; 20 MG/100ML; MG/100ML; MG/100ML; MG/100ML
INJECTION, SOLUTION INTRAVENOUS CONTINUOUS
Status: DISCONTINUED | OUTPATIENT
Start: 2020-05-02 | End: 2020-05-03 | Stop reason: HOSPADM

## 2020-05-02 RX ORDER — CLONIDINE HYDROCHLORIDE 0.1 MG/1
0.1 TABLET ORAL EVERY 6 HOURS PRN
Status: DISCONTINUED | OUTPATIENT
Start: 2020-05-02 | End: 2020-05-03 | Stop reason: HOSPADM

## 2020-05-02 RX ORDER — ONDANSETRON 4 MG/1
4 TABLET, ORALLY DISINTEGRATING ORAL EVERY 4 HOURS PRN
Status: DISCONTINUED | OUTPATIENT
Start: 2020-05-02 | End: 2020-05-03 | Stop reason: HOSPADM

## 2020-05-02 RX ORDER — MORPHINE SULFATE 4 MG/ML
2 INJECTION, SOLUTION INTRAMUSCULAR; INTRAVENOUS
Status: DISCONTINUED | OUTPATIENT
Start: 2020-05-02 | End: 2020-05-03 | Stop reason: HOSPADM

## 2020-05-02 RX ORDER — OXYCODONE HYDROCHLORIDE 5 MG/1
2.5 TABLET ORAL
Status: DISCONTINUED | OUTPATIENT
Start: 2020-05-02 | End: 2020-05-03 | Stop reason: HOSPADM

## 2020-05-02 RX ORDER — PROCHLORPERAZINE EDISYLATE 5 MG/ML
5-10 INJECTION INTRAMUSCULAR; INTRAVENOUS EVERY 4 HOURS PRN
Status: DISCONTINUED | OUTPATIENT
Start: 2020-05-02 | End: 2020-05-03 | Stop reason: HOSPADM

## 2020-05-02 RX ORDER — PROMETHAZINE HYDROCHLORIDE 25 MG/1
12.5-25 TABLET ORAL EVERY 4 HOURS PRN
Status: DISCONTINUED | OUTPATIENT
Start: 2020-05-02 | End: 2020-05-03 | Stop reason: HOSPADM

## 2020-05-02 RX ORDER — ASPIRIN 300 MG/1
300 SUPPOSITORY RECTAL DAILY
Status: DISCONTINUED | OUTPATIENT
Start: 2020-05-02 | End: 2020-05-03 | Stop reason: HOSPADM

## 2020-05-02 RX ORDER — PROMETHAZINE HYDROCHLORIDE 25 MG/1
12.5-25 SUPPOSITORY RECTAL EVERY 4 HOURS PRN
Status: DISCONTINUED | OUTPATIENT
Start: 2020-05-02 | End: 2020-05-03 | Stop reason: HOSPADM

## 2020-05-02 RX ORDER — ASPIRIN 81 MG/1
324 TABLET, CHEWABLE ORAL DAILY
Status: DISCONTINUED | OUTPATIENT
Start: 2020-05-02 | End: 2020-05-03 | Stop reason: HOSPADM

## 2020-05-02 RX ORDER — HYDROCHLOROTHIAZIDE 25 MG/1
25 TABLET ORAL DAILY
COMMUNITY
Start: 2020-03-04

## 2020-05-02 RX ORDER — OXYCODONE HYDROCHLORIDE 5 MG/1
5 TABLET ORAL
Status: DISCONTINUED | OUTPATIENT
Start: 2020-05-02 | End: 2020-05-03 | Stop reason: HOSPADM

## 2020-05-02 RX ORDER — POLYETHYLENE GLYCOL 3350 17 G/17G
1 POWDER, FOR SOLUTION ORAL
Status: DISCONTINUED | OUTPATIENT
Start: 2020-05-02 | End: 2020-05-03 | Stop reason: HOSPADM

## 2020-05-02 RX ORDER — AMOXICILLIN 250 MG
2 CAPSULE ORAL 2 TIMES DAILY
Status: DISCONTINUED | OUTPATIENT
Start: 2020-05-02 | End: 2020-05-03 | Stop reason: HOSPADM

## 2020-05-02 RX ORDER — LISINOPRIL 30 MG/1
30 TABLET ORAL 2 TIMES DAILY
COMMUNITY
Start: 2020-04-29

## 2020-05-02 RX ORDER — BISACODYL 10 MG
10 SUPPOSITORY, RECTAL RECTAL
Status: DISCONTINUED | OUTPATIENT
Start: 2020-05-02 | End: 2020-05-03 | Stop reason: HOSPADM

## 2020-05-02 RX ORDER — ASPIRIN 325 MG
325 TABLET ORAL DAILY
Status: DISCONTINUED | OUTPATIENT
Start: 2020-05-02 | End: 2020-05-03 | Stop reason: HOSPADM

## 2020-05-02 RX ORDER — ACETAMINOPHEN 325 MG/1
650 TABLET ORAL EVERY 6 HOURS PRN
Status: DISCONTINUED | OUTPATIENT
Start: 2020-05-02 | End: 2020-05-03 | Stop reason: HOSPADM

## 2020-05-02 RX ADMIN — ENOXAPARIN SODIUM 40 MG: 100 INJECTION SUBCUTANEOUS at 05:17

## 2020-05-02 RX ADMIN — SODIUM CHLORIDE, POTASSIUM CHLORIDE, SODIUM LACTATE AND CALCIUM CHLORIDE: 600; 310; 30; 20 INJECTION, SOLUTION INTRAVENOUS at 02:43

## 2020-05-02 RX ADMIN — SENNOSIDES AND DOCUSATE SODIUM 2 TABLET: 8.6; 5 TABLET ORAL at 16:51

## 2020-05-02 RX ADMIN — ASPIRIN 325 MG: 325 TABLET, FILM COATED ORAL at 05:17

## 2020-05-02 RX ADMIN — SENNOSIDES AND DOCUSATE SODIUM 2 TABLET: 8.6; 5 TABLET ORAL at 05:17

## 2020-05-02 RX ADMIN — CLONIDINE HYDROCHLORIDE 0.1 MG: 0.1 TABLET ORAL at 02:44

## 2020-05-02 RX ADMIN — SODIUM CHLORIDE, POTASSIUM CHLORIDE, SODIUM LACTATE AND CALCIUM CHLORIDE: 600; 310; 30; 20 INJECTION, SOLUTION INTRAVENOUS at 12:24

## 2020-05-02 RX ADMIN — SODIUM CHLORIDE, POTASSIUM CHLORIDE, SODIUM LACTATE AND CALCIUM CHLORIDE: 600; 310; 30; 20 INJECTION, SOLUTION INTRAVENOUS at 22:47

## 2020-05-02 ASSESSMENT — COGNITIVE AND FUNCTIONAL STATUS - GENERAL
STANDING UP FROM CHAIR USING ARMS: A LOT
HELP NEEDED FOR BATHING: A LITTLE
DAILY ACTIVITIY SCORE: 12
CLIMB 3 TO 5 STEPS WITH RAILING: A LOT
TOILETING: A LOT
CLIMB 3 TO 5 STEPS WITH RAILING: A LITTLE
SUGGESTED CMS G CODE MODIFIER MOBILITY: CJ
EATING MEALS: A LOT
SUGGESTED CMS G CODE MODIFIER DAILY ACTIVITY: CL
DRESSING REGULAR UPPER BODY CLOTHING: A LOT
SUGGESTED CMS G CODE MODIFIER DAILY ACTIVITY: CI
DRESSING REGULAR LOWER BODY CLOTHING: A LOT
TURNING FROM BACK TO SIDE WHILE IN FLAT BAD: A LITTLE
PERSONAL GROOMING: A LOT
WALKING IN HOSPITAL ROOM: A LOT
DAILY ACTIVITIY SCORE: 23
HELP NEEDED FOR BATHING: A LOT
WALKING IN HOSPITAL ROOM: A LITTLE
MOBILITY SCORE: 22

## 2020-05-02 ASSESSMENT — PATIENT HEALTH QUESTIONNAIRE - PHQ9
9. THOUGHTS THAT YOU WOULD BE BETTER OFF DEAD, OR OF HURTING YOURSELF: NOT AT ALL
SUM OF ALL RESPONSES TO PHQ QUESTIONS 1-9: 5
2. FEELING DOWN, DEPRESSED, IRRITABLE, OR HOPELESS: SEVERAL DAYS
1. LITTLE INTEREST OR PLEASURE IN DOING THINGS: NOT AT ALL
4. FEELING TIRED OR HAVING LITTLE ENERGY: SEVERAL DAYS
5. POOR APPETITE OR OVEREATING: NOT AT ALL
7. TROUBLE CONCENTRATING ON THINGS, SUCH AS READING THE NEWSPAPER OR WATCHING TELEVISION: MORE THAN HALF THE DAYS
6. FEELING BAD ABOUT YOURSELF - OR THAT YOU ARE A FAILURE OR HAVE LET YOURSELF OR YOUR FAMILY DOWN: SEVERAL DAYS
SUM OF ALL RESPONSES TO PHQ9 QUESTIONS 1 AND 2: 1
8. MOVING OR SPEAKING SO SLOWLY THAT OTHER PEOPLE COULD HAVE NOTICED. OR THE OPPOSITE, BEING SO FIGETY OR RESTLESS THAT YOU HAVE BEEN MOVING AROUND A LOT MORE THAN USUAL: NOT AT ALL
3. TROUBLE FALLING OR STAYING ASLEEP OR SLEEPING TOO MUCH: NOT AT ALL

## 2020-05-02 ASSESSMENT — ENCOUNTER SYMPTOMS
BACK PAIN: 1
CONSTITUTIONAL NEGATIVE: 1
NEUROLOGICAL NEGATIVE: 1
GASTROINTESTINAL NEGATIVE: 1
CARDIOVASCULAR NEGATIVE: 1
EYES NEGATIVE: 1
PSYCHIATRIC NEGATIVE: 1
RESPIRATORY NEGATIVE: 1

## 2020-05-02 ASSESSMENT — FIBROSIS 4 INDEX: FIB4 SCORE: 2.15

## 2020-05-02 ASSESSMENT — COPD QUESTIONNAIRES
COPD SCREENING SCORE: 6
HAVE YOU SMOKED AT LEAST 100 CIGARETTES IN YOUR ENTIRE LIFE: YES
IN THE PAST 12 MONTHS DO YOU DO LESS THAN YOU USED TO BECAUSE OF YOUR BREATHING PROBLEMS: DISAGREE/UNSURE
DURING THE PAST 4 WEEKS HOW MUCH DID YOU FEEL SHORT OF BREATH: SOME OF THE TIME
DO YOU EVER COUGH UP ANY MUCUS OR PHLEGM?: YES, A FEW DAYS A WEEK OR MONTH

## 2020-05-02 ASSESSMENT — LIFESTYLE VARIABLES
AVERAGE NUMBER OF DAYS PER WEEK YOU HAVE A DRINK CONTAINING ALCOHOL: 0
HOW MANY TIMES IN THE PAST YEAR HAVE YOU HAD 5 OR MORE DRINKS IN A DAY: 0
ALCOHOL_USE: NO
ON A TYPICAL DAY WHEN YOU DRINK ALCOHOL HOW MANY DRINKS DO YOU HAVE: 0
EVER HAD A DRINK FIRST THING IN THE MORNING TO STEADY YOUR NERVES TO GET RID OF A HANGOVER: NO
TOTAL SCORE: 0
HAVE YOU EVER FELT YOU SHOULD CUT DOWN ON YOUR DRINKING: NO
CONSUMPTION TOTAL: NEGATIVE
EVER_SMOKED: YES
TOTAL SCORE: 0
EVER FELT BAD OR GUILTY ABOUT YOUR DRINKING: NO
EVER_SMOKED: YES
TOTAL SCORE: 0
HAVE PEOPLE ANNOYED YOU BY CRITICIZING YOUR DRINKING: NO

## 2020-05-02 NOTE — ASSESSMENT & PLAN NOTE
Patient was using methamphetamine prior to arrival   Urine drug screen positive for opioids and amphetamines  Denies any other substance use other than Methamphetamine  Monitor

## 2020-05-02 NOTE — H&P
"Hospital Medicine History & Physical Note    Date of Service  5/2/2020    Primary Care Physician  Pcp Not In Computer    Code Status  Full Code    Chief Complaint  Chief Complaint   Patient presents with   • Shortness of Breath   • Chest Pain   • Loss of Consciousness       History of Presenting Illness  60 y.o. male with current methamphetamine abuse, apparent history of hypertension not currently taking therapy, and chronic active hepatitis C was apparently well until the evening prior to admission when while doing methamphetamines with a friend, he \"passed out\".  Thinking that the patient had cardiac arrest, the friend subsequently did chest compressions for an unclear amount of time.  EMS was called, and he was given narcan with some improvement and brought to this facility.  Currently he has limited ability to give history, falling asleep easily after 1-2 words.  He is unable to provide a full accurate review of systems.       Review of Systems  Review of Systems   Unable to perform ROS: Mental status change       Past Medical History   has a past medical history of Dental disorder, Emphysema of lung (HCC), Heart burn, Hepatitis C, Hiatus hernia syndrome, Hypertension, and Pneumonia (1998).    Surgical History   has a past surgical history that includes testicle exploration; other; and inguinal hernia repair (Right, 2/5/2019).     Family History  family history includes Diabetes in his maternal grandmother; Hypertension in his maternal grandmother and mother; Stroke in his maternal grandmother and mother.     Social History   reports that he has been smoking cigarettes. He has a 5.00 pack-year smoking history. He has never used smokeless tobacco. He reports current alcohol use. He reports current drug use. Drug: Marijuana.    Allergies  Allergies   Allergen Reactions   • Bactrim [Sulfamethoxazole W-Trimethoprim] Swelling     Lost a testicle as a result of the reaction skin peeled   • Sulfa Drugs  "       Medications  None       Physical Exam  Temp:  [36.1 °C (97 °F)] 36.1 °C (97 °F)  Pulse:  [87-93] 87  Resp:  [16] 16  BP: (189)/(114) 189/114  SpO2:  [97 %-99 %] 97 %    Physical Exam  Vitals signs and nursing note reviewed.   Constitutional:       General: He is not in acute distress.     Appearance: Normal appearance. He is not ill-appearing.   HENT:      Head: Normocephalic and atraumatic.      Nose: Nose normal.      Mouth/Throat:      Mouth: Mucous membranes are moist.      Pharynx: Oropharynx is clear. No oropharyngeal exudate or posterior oropharyngeal erythema.   Eyes:      Extraocular Movements: Extraocular movements intact.      Conjunctiva/sclera: Conjunctivae normal.      Pupils: Pupils are equal, round, and reactive to light.   Neck:      Musculoskeletal: Normal range of motion and neck supple. No muscular tenderness.   Cardiovascular:      Rate and Rhythm: Normal rate and regular rhythm.      Pulses: Normal pulses.      Heart sounds: Normal heart sounds. No murmur. No friction rub. No gallop.    Pulmonary:      Effort: Pulmonary effort is normal. No respiratory distress.      Breath sounds: Normal breath sounds. No wheezing, rhonchi or rales.   Abdominal:      General: Abdomen is flat. Bowel sounds are normal. There is no distension.      Palpations: Abdomen is soft. There is no mass.      Tenderness: There is no abdominal tenderness.   Musculoskeletal: Normal range of motion.         General: No swelling or deformity.   Lymphadenopathy:      Cervical: No cervical adenopathy.   Skin:     General: Skin is warm and dry.   Neurological:      General: No focal deficit present.      Mental Status: He is disoriented.      Cranial Nerves: No cranial nerve deficit.      Motor: No weakness.      Gait: Gait normal.      Comments: tremulous   Psychiatric:         Mood and Affect: Mood normal.         Behavior: Behavior normal.         Laboratory:  Recent Labs     05/01/20  2250   WBC 5.2   RBC 4.02*    HEMOGLOBIN 13.2*   HEMATOCRIT 37.9*   MCV 94.3   MCH 32.8   MCHC 34.8   RDW 42.2   PLATELETCT 192   MPV 8.8*     Recent Labs     05/01/20 2250   SODIUM 142   POTASSIUM 4.0   CHLORIDE 105   CO2 26   GLUCOSE 128*   BUN 25*   CREATININE 1.01   CALCIUM 8.9     Recent Labs     05/01/20 2250   ALTSGPT 31   ASTSGOT 37   ALKPHOSPHAT 67   TBILIRUBIN 0.4   LIPASE 29   GLUCOSE 128*         No results for input(s): NTPROBNP in the last 72 hours.      Recent Labs     05/01/20  2250   TROPONINT 24*       Imaging:  DX-CHEST-PORTABLE (1 VIEW)   Final Result      1.  Asymmetric interstitial opacities lower lobe, right greater than left, which may represent asymmetric interstitial edema or pneumonia.      2.  No lobar consolidation or pleural effusion.               Assessment/Plan:      * Chest pain  Assessment & Plan  Atypical, with indeterminately elevated troponin.  This in the setting of friend performing CPR.  Plan to trend troponin, if continued elevation may need cardiology consultation.      Hypertensive urgency  Assessment & Plan  Suspect consequence of methamphetamine intoxication.  Monitor.      Normocytic anemia  Assessment & Plan  Suspect this is chronic with no current evidence of bleed. Transfuse if needed for hemoglobin less than 7 gm/dL      Amphetamine abuse (HCC)  Assessment & Plan  Ongoing and just prior to arrival.

## 2020-05-02 NOTE — PROGRESS NOTES
This nurse spoke with dr. Rae regarding the pt's BP of 166/104 post prn blood pressure meds. No new orders received at this time. Will cont to monitor.

## 2020-05-02 NOTE — ED TRIAGE NOTES
Chief Complaint   Patient presents with   • Shortness of Breath   • Chest Pain   • Loss of Consciousness       BIBA from friend's house.     Pt found down by friend who performed CPR. +LOC for 20 minutes.  1.5 mg narcan administered pta with some effect.  15L non rebreather placed on pt for SpO2 of 80% on RA.    AO x2 upon arrival.  Pt titrated to 6L OM at 90%.    Seen by ERP.

## 2020-05-02 NOTE — PROGRESS NOTES
"[facility] Medicine Daily Progress Note    Date of Service  5/2/2020    Chief Complaint  60 y.o. male admitted 5/1/2020 after losing consciousness while using methamphetamine with some friends. Per report, his friend thought patient was having a cardiac arrest and did chest compressions. EMS were called, patient received narcan with minimal response.     He has chronic active Hepatitis C.     Hospital Course  Troponin was 24, D-Dimer was negative, Covid-19 was negative, EKG was unremarkable. Chest X-ray reported \"asymmetric interstitial opacities lower lobe, right greater than left, which may represent asymmetric interstitial edema or pneumonia\". Blood pressure was elevated. He was afebrile with normal respiratory and heart rate. He was hypoxic on admission (71% on room air), improved to 99% with 6L/M oxygen mask. Urine drug screen is positive for opioids and amphetamines.    Interval Problem Update  Stable, oxygen saturation has improved to 100% on 2L/M nasal cannula. Troponin has slightly increased 24 -> 36.     Consultants/Specialty  None    Code Status  Full Code    Disposition  Home when stable    Review of Systems  Review of Systems   Constitutional: Negative.    HENT: Negative.    Eyes: Negative.    Respiratory: Negative.    Cardiovascular: Negative.  Negative for chest pain.   Gastrointestinal: Negative.    Genitourinary: Negative.    Musculoskeletal: Positive for back pain.        Chronic back pain   Skin: Negative.    Neurological: Negative.    Endo/Heme/Allergies: Negative.    Psychiatric/Behavioral: Negative.         Physical Exam  Temp:  [36.1 °C (97 °F)-36.9 °C (98.5 °F)] 36.9 °C (98.5 °F)  Pulse:  [68-93] 68  Resp:  [14-20] 20  BP: (130-189)/() 130/89  SpO2:  [71 %-100 %] 100 %    Physical Exam  Constitutional:       Appearance: Normal appearance.   HENT:      Head: Normocephalic and atraumatic.      Nose: Nose normal.   Eyes:      Pupils: Pupils are equal, round, and reactive to light.   Neck:    "   Musculoskeletal: Normal range of motion.   Cardiovascular:      Rate and Rhythm: Normal rate.      Pulses: Normal pulses.   Pulmonary:      Effort: Pulmonary effort is normal.      Breath sounds: Normal breath sounds.   Abdominal:      Palpations: Abdomen is soft.   Musculoskeletal: Normal range of motion.      Right lower leg: No edema.      Left lower leg: No edema.   Skin:     General: Skin is warm.   Neurological:      General: No focal deficit present.      Mental Status: He is alert.   Psychiatric:         Mood and Affect: Mood normal.         Fluids    Intake/Output Summary (Last 24 hours) at 5/2/2020 1316  Last data filed at 5/2/2020 0303  Gross per 24 hour   Intake 50 ml   Output --   Net 50 ml       Laboratory  Recent Labs     05/01/20  2250   WBC 5.2   RBC 4.02*   HEMOGLOBIN 13.2*   HEMATOCRIT 37.9*   MCV 94.3   MCH 32.8   MCHC 34.8   RDW 42.2   PLATELETCT 192   MPV 8.8*     Recent Labs     05/01/20  2250   SODIUM 142   POTASSIUM 4.0   CHLORIDE 105   CO2 26   GLUCOSE 128*   BUN 25*   CREATININE 1.01   CALCIUM 8.9                   Imaging  DX-CHEST-PORTABLE (1 VIEW)   Final Result      1.  Asymmetric interstitial opacities lower lobe, right greater than left, which may represent asymmetric interstitial edema or pneumonia.      2.  No lobar consolidation or pleural effusion.              Assessment/Plan  * Chest pain  Assessment & Plan  Patient denies having chest pain, states his back was hurting  Pain was most likely from chest compressions  Troponin has slightly increased 24 -> 36, continue to trend  Elevated troponin is likely due to chest compressions  D-Dimer was normal, chest x-ray showed interstitial edema vs. Pneumonia  Afebrile, hemodynamically stable, no leukocytosis  Will check Procalcitonin  Wean oxygen as tolerated    Hypertensive urgency  Assessment & Plan  BP stabilized  Likely due to Methamphetamine intoxication  Continue PRN Clonidine  Monitor    Normocytic anemia  Assessment &  Plan  Suspected to be chronic  No obvious signs of bleeding  Monitor    Amphetamine abuse (HCC)  Assessment & Plan  Patient was using methamphetamine prior to arrival   Urine drug screen positive for opioids and amphetamines  Denies any other substance use other than Methamphetamine  Monitor       VTE prophylaxis: SCD's

## 2020-05-02 NOTE — PROGRESS NOTES
This nurse escorted pt to room 804 bed 2 via stretcher via [place]. No respiratory distress noted. No s/s of pain. Pt is very lethargic and is unable to stay awake to answer any questions. Pt is on a oxygen mask at 5l via Nasal Cannula. Pt moved from stretcher to the bed. Bed alarm on; call bell within reach. Bed alarm on. Iv intact; sl.

## 2020-05-02 NOTE — ED NOTES
Report given to Nieves DONATO. All questions answered. Pt transported via gurney with RN, monitored with oxygen.

## 2020-05-02 NOTE — ED PROVIDER NOTES
"ED Provider Note    CHIEF COMPLAINT  Chief Complaint   Patient presents with   • Shortness of Breath   • Chest Pain   • Loss of Consciousness        HPI    Primary care provider: Pcp Not In Computer   History obtained from: Patient and EMS  History limited by: None     Shravan Redd is a 60 y.o. male who presents to the ED by EMS for reported loss of consciousness.  Per EMS, patient's friend called because patient passed out and the friend performed chest compressions.  Patient does not remember what happened but believes he was smoking meth just prior to passing out.  EMS gave patient Narcan and he apparently became more responsive.  No reported injury or trauma.  Patient is reporting chest pain \"right in the middle\" along with shortness of breath.  He has had some cough recently.  He denies fever/sore throat/recent travels or known ill contacts.  He reports having nausea and vomiting today but unable to give any further details.  He reports having slight diarrhea but unable to give further details.  He denies any dysuria.  He denies any focal weakness or sensory change.  He denies suicidal or homicidal ideations.    REVIEW OF SYSTEMS  Please see HPI for pertinent positives/negatives.  All other systems reviewed and are negative.     PAST MEDICAL HISTORY  Past Medical History:   Diagnosis Date   • Pneumonia 1998   • Dental disorder    • Emphysema of lung (HCC)    • Heart burn    • Hepatitis C    • Hiatus hernia syndrome    • Hypertension     pt states bp runs high even on meds        SURGICAL HISTORY  Past Surgical History:   Procedure Laterality Date   • INGUINAL HERNIA REPAIR Right 2/5/2019    Procedure: INGUINAL HERNIA REPAIR;  Surgeon: Jh Rowe M.D.;  Location: SURGERY SAME DAY [facility];  Service: General   • OTHER      orif jaw   • TESTICLE EXPLORATION      one testicle removed        SOCIAL HISTORY  Social History     Tobacco Use   • Smoking status: Current Every Day Smoker     Packs/day: " 0.25     Years: 20.00     Pack years: 5.00     Types: Cigarettes   • Smokeless tobacco: Never Used   Substance and Sexual Activity   • Alcohol use: Yes     Comment: occ   • Drug use: Yes     Types: Marijuana     Comment: meth and marijuana   • Sexual activity: Yes     Partners: Female     Birth control/protection: Condom        FAMILY HISTORY  Family History   Problem Relation Age of Onset   • Stroke Mother    • Hypertension Mother    • Hypertension Maternal Grandmother    • Stroke Maternal Grandmother    • Diabetes Maternal Grandmother         CURRENT MEDICATIONS  Home Medications     Reviewed by Elaina Thomas R.N. (Registered Nurse) on 05/01/20 at 2228  Med List Status: Complete   Medication Last Dose Status        Patient Jens Taking any Medications                        ALLERGIES  Allergies   Allergen Reactions   • Bactrim [Sulfamethoxazole W-Trimethoprim] Swelling     Lost a testicle as a result of the reaction skin peeled   • Sulfa Drugs         PHYSICAL EXAM  VITAL SIGNS: BP (!) 187/99   Pulse 79   Temp 36.1 °C (97 °F) (Temporal)   Resp 15   Ht 1.829 m (6')   Wt 77.1 kg (170 lb)   SpO2 98%   BMI 23.06 kg/m²  @ARMOND[[id number]::@     Pulse ox interpretation: 97% I interpret this pulse ox as normal     Cardiac monitor interpretation: Sinus rhythm with heart rate in the 80s as interpreted by me.  The patient presented with syncope, chest pain and shortness of breath and cardiac monitor was ordered to monitor for dysrhythmia.    Constitutional: Well developed, well nourished, drowsy in no apparent distress, nontoxic appearance    HENT: No external signs of trauma, normocephalic, oropharynx moist and clear, nose normal    Eyes: PERRL, EOMI, vision and visual fields are grossly intact bilaterally, conjunctiva without erythema, no discharge, no icterus    Neck: Soft and supple, trachea midline, no stridor, no tenderness, no LAD, no JVD, good ROM    Cardiovascular: Regular rate and rhythm, no  murmurs/rubs/gallops, strong distal pulses and good perfusion    Thorax & Lungs: No respiratory distress, CTAB   Abdomen: Soft, nontender, nondistended, no guarding, no rebound, normal BS    Back: No CVAT    Extremities: No cyanosis, no edema, no gross deformity, good ROM, no tenderness, intact distal pulses with brisk cap refill    Skin: Warm, dry, no pallor/cyanosis, no rash noted    Lymphatic: No lymphadenopathy noted    Neuro: Drowsy and oriented to person, place, and time.  GCS 15.  CN II-XII grossly intact.  Normal speech.  Equal strength bilateral UE/LE.  Sensation intact to touch.  No cerebellar signs.  Psychiatric: Cooperative, flat affect, denies suicidal or homicidal ideations, no signs of active hallucinations or delusions      DIAGNOSTIC STUDIES / PROCEDURES    EKG  12 Lead EKG obtained at 2218 and interpreted by me:   Rate: 85   Rhythm: Sinus rhythm   Ectopy: None  Intervals: QTc 495  Axis: Normal   QRS: Normal   ST segments: Minimal ST depression in inferior and lateral leads  T Waves: Normal    Clinical Impression: Sinus rhythm with borderline QT prolongation and minimal ST depression in inferior and lateral leads  Compared to February 4, 2019 with similar ST depression in inferior and lateral leads      LABS  All labs reviewed by me.     Results for orders placed or performed during the hospital encounter of 05/01/20   CBC with Differential   Result Value Ref Range    WBC 5.2 4.8 - 10.8 K/uL    RBC 4.02 (L) 4.70 - 6.10 M/uL    Hemoglobin 13.2 (L) 14.0 - 18.0 g/dL    Hematocrit 37.9 (L) 42.0 - 52.0 %    MCV 94.3 81.4 - 97.8 fL    MCH 32.8 27.0 - 33.0 pg    MCHC 34.8 33.7 - 35.3 g/dL    RDW 42.2 35.9 - 50.0 fL    Platelet Count 192 164 - 446 K/uL    MPV 8.8 (L) 9.0 - 12.9 fL    Neutrophils-Polys 45.20 44.00 - 72.00 %    Lymphocytes 38.30 22.00 - 41.00 %    Monocytes 9.00 0.00 - 13.40 %    Eosinophils 6.50 0.00 - 6.90 %    Basophils 0.60 0.00 - 1.80 %    Immature Granulocytes 0.40 0.00 - 0.90 %     Nucleated RBC 0.00 /100 WBC    Neutrophils (Absolute) 2.36 1.82 - 7.42 K/uL    Lymphs (Absolute) 2.00 1.00 - 4.80 K/uL    Monos (Absolute) 0.47 0.00 - 0.85 K/uL    Eos (Absolute) 0.34 0.00 - 0.51 K/uL    Baso (Absolute) 0.03 0.00 - 0.12 K/uL    Immature Granulocytes (abs) 0.02 0.00 - 0.11 K/uL    NRBC (Absolute) 0.00 K/uL   Complete Metabolic Panel (CMP)   Result Value Ref Range    Sodium 142 135 - 145 mmol/L    Potassium 4.0 3.6 - 5.5 mmol/L    Chloride 105 96 - 112 mmol/L    Co2 26 20 - 33 mmol/L    Anion Gap 11.0 7.0 - 16.0    Glucose 128 (H) 65 - 99 mg/dL    Bun 25 (H) 8 - 22 mg/dL    Creatinine 1.01 0.50 - 1.40 mg/dL    Calcium 8.9 8.5 - 10.5 mg/dL    AST(SGOT) 37 12 - 45 U/L    ALT(SGPT) 31 2 - 50 U/L    Alkaline Phosphatase 67 30 - 99 U/L    Total Bilirubin 0.4 0.1 - 1.5 mg/dL    Albumin 4.1 3.2 - 4.9 g/dL    Total Protein 7.5 6.0 - 8.2 g/dL    Globulin 3.4 1.9 - 3.5 g/dL    A-G Ratio 1.2 g/dL   Troponin   Result Value Ref Range    Troponin T 24 (H) 6 - 19 ng/L   LIPASE   Result Value Ref Range    Lipase 29 11 - 82 U/L   DIAGNOSTIC ALCOHOL   Result Value Ref Range    Diagnostic Alcohol <10.1 0.0 - 10.1 mg/dL   COVID/SARS CoV-2   Result Value Ref Range    COVID Order Status Received    MAGNESIUM   Result Value Ref Range    Magnesium 2.2 1.5 - 2.5 mg/dL   D-DIMER   Result Value Ref Range    D-Dimer Screen <0.27 0.00 - 0.50 ug/mL (FEU)   SARS-CoV-2, PCR (In-House)   Result Value Ref Range    SARS-CoV-2 Source NP Swab     SARS-CoV-2 by PCR NotDetected NotDetected   ESTIMATED GFR   Result Value Ref Range    GFR If African American >60 >60 mL/min/1.73 m 2    GFR If Non African American >60 >60 mL/min/1.73 m 2   EKG   Result Value Ref Range    Report       [facility] Emergency Dept.    Test Date:  2020  Pt Name:    SONIA CIERA                Department: ER  MRN:        [id number]                      Room:        22  Gender:     Male                         Technician: [id number]  :         1960                   Requested By:LAINA HERNÁNDEZ  Order #:    [id number]                    Reading MD:    Measurements  Intervals                                Axis  Rate:       85                           P:          73  UT:         168                          QRS:        39  QRSD:       100                          T:          75  QT:         416  QTc:        495    Interpretive Statements  SINUS RHYTHM  RSR' IN V1 OR V2, PROBABLY NORMAL VARIANT  BORDERLINE PROLONGED QT INTERVAL  Compared to ECG 02/04/2019 08:43:33  No significant changes          RADIOLOGY  The radiologist's interpretation of all radiological studies have been reviewed by me.     DX-CHEST-PORTABLE (1 VIEW)   Final Result      1.  Asymmetric interstitial opacities lower lobe, right greater than left, which may represent asymmetric interstitial edema or pneumonia.      2.  No lobar consolidation or pleural effusion.                COURSE & MEDICAL DECISION MAKING  Nursing notes, VS, PMSFHx reviewed in chart.     Review of past medical records shows the patient was last admitted to this hospital February 5, 2019 for inguinal hernia repair.  No recent visits to this ED.      Differential diagnoses considered include but are not limited to: Syncope, near syncope, hypoglycemia, Sz, vasovagal episode, dysrhythmia, ACS, PE, dehydration, electrolyte abnormality, drug use      Pt risk-stratified as intermediate risk for MACE in the next 6 weeks by HEART Score: 4    HISTORY  Highly suspicious +2  Moderately suspicious +1  Slightly suspicious 0    EKG  Significant ST depression +2  Nonspecific repolarization disturbance +1  Normal 0    AGE  ? 65 +2  45-65 +1  < 45 0    RISK FACTORS  Hypercholesterolemia, HTN, DM, Cigarette smoking, positive family history, obesity  ? 3 risk factors or history of atherosclerotic disease +2  1-2 risk factors +1  No risk factors known 0    TROPONIN  ? 3× normal limit +2  1-3× normal limit +1  ? normal limit 0        0040: D/W Dr. Thorne, hospitalist, who will admit patient      History and physical exam as above.  EKG showed sinus rhythm with borderline QT prolongation and minimal ST depression in inferior and lateral leads.  However, the ST depression appears unchanged compared to prior EKG.  Chest x-ray with findings as above.  Labs showed mild anemia and slightly elevated troponin.  Elevated troponin may be due to chest compression performed by his friend after he reportedly passed out after using meth.  This may also be drug-induced or may be due to underlying cardiac etiology.  I discussed the findings with the patient and he is agreeable to admission.  Discussed with Dr. Thorne who graciously agreed to admit patient for further care.      FINAL IMPRESSION  1. Syncope, unspecified syncope type Acute   2. Acute chest pain Acute   3. Dyspnea, unspecified type Acute   4. Elevated troponin Active   5. Drug use Active          DISPOSITION  Patient will be admitted by hospitalist for further care      Electronically signed by: Irdis Ellison D.O., 5/1/2020 10:30 PM      Portions of this record were made with voice recognition software.  Despite my review, spelling/grammar/context errors may still remain.  Interpretation of this chart should be taken in this context.

## 2020-05-02 NOTE — CARE PLAN
Problem: Communication  Goal: The ability to communicate needs accurately and effectively will improve  Outcome: PROGRESSING AS EXPECTED  Note: Pt is able to communicate needs appropriately. Call light within reach.       Problem: Safety  Goal: Will remain free from injury  Outcome: PROGRESSING AS EXPECTED  Note: Fall precautions in place. Bed in lowest position. Non-skid socks in place. Personal possessions within reach. Mobility sign on door. Bed-alarm on. Call light within reach. Pt educated regarding fall prevention and states understanding.

## 2020-05-02 NOTE — ASSESSMENT & PLAN NOTE
Patient denies having chest pain, states his back was hurting  Pain was most likely from chest compressions  Troponin has slightly increased 24 -> 36, continue to trend  Elevated troponin is likely due to chest compressions  D-Dimer was normal, chest x-ray showed interstitial edema vs. Pneumonia  Afebrile, hemodynamically stable, no leukocytosis  Will check Procalcitonin  Wean oxygen as tolerated

## 2020-05-02 NOTE — CARE PLAN
Problem: Communication  Goal: The ability to communicate needs accurately and effectively will improve  Outcome: PROGRESSING SLOWER THAN EXPECTED  Note: Pt is lethargic and confused. Pt can not stay awake to answer questions.      Problem: Safety  Goal: Will remain free from injury  Outcome: PROGRESSING SLOWER THAN EXPECTED  Note: Call bell within reach; bed alarm on; bedrest; confused.

## 2020-05-02 NOTE — PROGRESS NOTES
2 RN skin check completed with Rigoberto RN:   No skin breakdown noted behind bilateral ears, elbows, or sacrum; skin is blanching and intact. Pt's bilateral heels are pink and blanching and slightly dry.

## 2020-05-03 VITALS
BODY MASS INDEX: 22.72 KG/M2 | SYSTOLIC BLOOD PRESSURE: 115 MMHG | TEMPERATURE: 98 F | WEIGHT: 167.77 LBS | RESPIRATION RATE: 18 BRPM | HEART RATE: 66 BPM | HEIGHT: 72 IN | OXYGEN SATURATION: 96 % | DIASTOLIC BLOOD PRESSURE: 74 MMHG

## 2020-05-03 PROBLEM — I16.0 HYPERTENSIVE URGENCY: Status: RESOLVED | Noted: 2020-05-02 | Resolved: 2020-05-03

## 2020-05-03 PROBLEM — R07.9 CHEST PAIN: Status: RESOLVED | Noted: 2020-05-02 | Resolved: 2020-05-03

## 2020-05-03 PROBLEM — D64.9 NORMOCYTIC ANEMIA: Status: RESOLVED | Noted: 2020-05-02 | Resolved: 2020-05-03

## 2020-05-03 LAB
ANION GAP SERPL CALC-SCNC: 11 MMOL/L (ref 7–16)
BASOPHILS # BLD AUTO: 0.3 % (ref 0–1.8)
BASOPHILS # BLD: 0.04 K/UL (ref 0–0.12)
BUN SERPL-MCNC: 17 MG/DL (ref 8–22)
CALCIUM SERPL-MCNC: 8.8 MG/DL (ref 8.5–10.5)
CHLORIDE SERPL-SCNC: 101 MMOL/L (ref 96–112)
CO2 SERPL-SCNC: 25 MMOL/L (ref 20–33)
CREAT SERPL-MCNC: 0.78 MG/DL (ref 0.5–1.4)
EOSINOPHIL # BLD AUTO: 0.3 K/UL (ref 0–0.51)
EOSINOPHIL NFR BLD: 2.5 % (ref 0–6.9)
ERYTHROCYTE [DISTWIDTH] IN BLOOD BY AUTOMATED COUNT: 40.8 FL (ref 35.9–50)
GLUCOSE SERPL-MCNC: 101 MG/DL (ref 65–99)
HCT VFR BLD AUTO: 35.4 % (ref 42–52)
HGB BLD-MCNC: 12.4 G/DL (ref 14–18)
IMM GRANULOCYTES # BLD AUTO: 0.04 K/UL (ref 0–0.11)
IMM GRANULOCYTES NFR BLD AUTO: 0.3 % (ref 0–0.9)
LYMPHOCYTES # BLD AUTO: 2.18 K/UL (ref 1–4.8)
LYMPHOCYTES NFR BLD: 18.5 % (ref 22–41)
MCH RBC QN AUTO: 32.5 PG (ref 27–33)
MCHC RBC AUTO-ENTMCNC: 35 G/DL (ref 33.7–35.3)
MCV RBC AUTO: 92.9 FL (ref 81.4–97.8)
MONOCYTES # BLD AUTO: 0.75 K/UL (ref 0–0.85)
MONOCYTES NFR BLD AUTO: 6.4 % (ref 0–13.4)
NEUTROPHILS # BLD AUTO: 8.46 K/UL (ref 1.82–7.42)
NEUTROPHILS NFR BLD: 72 % (ref 44–72)
NRBC # BLD AUTO: 0 K/UL
NRBC BLD-RTO: 0 /100 WBC
PLATELET # BLD AUTO: 176 K/UL (ref 164–446)
PMV BLD AUTO: 8.9 FL (ref 9–12.9)
POTASSIUM SERPL-SCNC: 4.3 MMOL/L (ref 3.6–5.5)
RBC # BLD AUTO: 3.81 M/UL (ref 4.7–6.1)
SODIUM SERPL-SCNC: 137 MMOL/L (ref 135–145)
TROPONIN T SERPL-MCNC: 31 NG/L (ref 6–19)
WBC # BLD AUTO: 11.8 K/UL (ref 4.8–10.8)

## 2020-05-03 PROCEDURE — 99239 HOSP IP/OBS DSCHRG MGMT >30: CPT | Performed by: INTERNAL MEDICINE

## 2020-05-03 PROCEDURE — 80048 BASIC METABOLIC PNL TOTAL CA: CPT

## 2020-05-03 PROCEDURE — 700111 HCHG RX REV CODE 636 W/ 250 OVERRIDE (IP): Performed by: HOSPITALIST

## 2020-05-03 PROCEDURE — 700102 HCHG RX REV CODE 250 W/ 637 OVERRIDE(OP): Performed by: HOSPITALIST

## 2020-05-03 PROCEDURE — 85025 COMPLETE CBC W/AUTO DIFF WBC: CPT

## 2020-05-03 PROCEDURE — A9270 NON-COVERED ITEM OR SERVICE: HCPCS | Performed by: HOSPITALIST

## 2020-05-03 PROCEDURE — 36415 COLL VENOUS BLD VENIPUNCTURE: CPT

## 2020-05-03 PROCEDURE — 84484 ASSAY OF TROPONIN QUANT: CPT

## 2020-05-03 RX ADMIN — ENOXAPARIN SODIUM 40 MG: 100 INJECTION SUBCUTANEOUS at 05:00

## 2020-05-03 RX ADMIN — ASPIRIN 325 MG: 325 TABLET, FILM COATED ORAL at 05:00

## 2020-05-03 NOTE — PROGRESS NOTES
Patient discharged home. Patient AOX 4.  IV and tele box removed. Discharge instructions provided to patient and reviewed with them. All questions answered and instructed on when to follow up with MD. Patient escorted off unit by this RN; patient discharged home to friend.

## 2020-05-03 NOTE — DISCHARGE INSTRUCTIONS
Discharge Instructions    Discharged to home by car with self. Discharged via walking, hospital escort: Yes.  Special equipment needed: Not Applicable    Be sure to schedule a follow-up appointment with your primary care doctor or any specialists as instructed.     Discharge Plan:   Diet Plan: Discussed  Activity Level: Discussed  Smoking Cessation Offered: Patient Refused  Confirmed Follow up Appointment: Patient to Call and Schedule Appointment  Confirmed Symptoms Management: Discussed  Medication Reconciliation Updated: Yes    I understand that a diet low in cholesterol, fat, and sodium is recommended for good health. Unless I have been given specific instructions below for another diet, I accept this instruction as my diet prescription.   Other diet: Regular    Special Instructions: None    · Is patient discharged on Warfarin / Coumadin?   No     Depression / Suicide Risk    As you are discharged from this [facility] facility, it is important to learn how to keep safe from harming yourself.    Recognize the warning signs:  · Abrupt changes in personality, positive or negative- including increase in energy   · Giving away possessions  · Change in eating patterns- significant weight changes-  positive or negative  · Change in sleeping patterns- unable to sleep or sleeping all the time   · Unwillingness or inability to communicate  · Depression  · Unusual sadness, discouragement and loneliness  · Talk of wanting to die  · Neglect of personal appearance   · Rebelliousness- reckless behavior  · Withdrawal from people/activities they love  · Confusion- inability to concentrate     If you or a loved one observes any of these behaviors or has concerns about self-harm, here's what you can do:  · Talk about it- your feelings and reasons for harming yourself  · Remove any means that you might use to hurt yourself (examples: pills, rope, extension cords, firearm)  · Get professional help from the community (Mental  Health, Substance Abuse, psychological counseling)  · Do not be alone:Call your Safe Contact- someone whom you trust who will be there for you.  · Call your local CRISIS HOTLINE 861-4865 or 278-868-3284  · Call your local Children's Mobile Crisis Response Team [place] (361) 457-4547 or www.TweetUp  · Call the toll free National Suicide Prevention Hotlines   · National Suicide Prevention Lifeline 387-091-GFUE (5622)  · National Hope Line Network 800-SUICIDE (188-6976)              Substance Use Disorder  Substance use disorder happens when a person's repeated use of drugs or alcohol interferes with his or her ability to be productive. This disorder can cause problems with your mental and physical health. It can affect your ability to have healthy relationships, and it can keep you from being able to meet your responsibilities at work, home, or school. It can also lead to addiction.  The most commonly abused substances include:  · Alcohol.  · Tobacco.  · Marijuana.  · Stimulants, such as cocaine and methamphetamine.  · Hallucinogens, such as LSD and PCP.  · Opioids, such as some prescription pain medicines and heroin.  What are the causes?  This condition may develop due to social, psychological, or physical reasons. Causes include:  · Stress.  · Abuse.  · Peer pressure.  · Anxiety.  · Depression.  What increases the risk?  This condition is more likely to develop in people who:  · Are stressed.  · Have been abused.  · Have a mental health disorder, such as depression.  · Are born with certain genes.  What are the signs or symptoms?  Symptoms of this condition include:  · Using the substance for longer periods of time or at a higher dosage than what is normal or intended.  · Having a lasting desire to use the substance.  · Being unable to slow down or stop your use of the substance.  · Spending an abnormal amount of time seeking the substance, using the substance, or recovering from using the  substance.  · Craving the substance.  · Substance use that:  ¨ Interferes with your work, school, or home life.  ¨ Interferes with your personal and social relationships.  ¨ Makes you give up activities that you once enjoyed or found important.  · Using the substance even though:  ¨ You know it is dangerous or bad for your health.  ¨ You know it is causing problems in your life.  · Needing more and more of the substance to get the same effect (developing tolerance).  · Experiencing physical symptoms if you do not use the substance (withdrawal).  · Using the substance to avoid withdrawal.  How is this diagnosed?  This condition may be diagnosed based on:  · Your history of substance use.  · The way in which substance abuse affects your life.  · Having at least two symptoms of substance use disorder within a 12-month period.  Your health care provider may also test your blood and urine for alcohol and drugs.  How is this treated?  This condition may be treated by:  · Stopping substance use safely. This may require taking medicines and being closely observed for several days.  · Taking part in group and individual counseling from mental health providers who help people with substance use disorder.  · Staying at a residential treatment center for several days or weeks.  · Attending daily counseling sessions at a treatment center.  · Taking medicine as told by your health care provider:  ¨ To ease symptoms and prevent complications during withdrawal.  ¨ To treat other mental health issues, such as depression or anxiety.  ¨ To block cravings by causing the same effects as the substance.  ¨ To block the effects of the substance or replace good sensations with unpleasant ones.  · Going to a support group to share your experience with others who are going through the same thing. These groups are an important part of long-term recovery for many people. They include 12-step programs like Alcoholics Anonymous and Narcotics  Anonymous.  Recovery can be a long process. Many people who undergo treatment start using the substance again after stopping. This is called a relapse. If you have a relapse, that does not mean that treatment will not work.  Follow these instructions at home:  · Take over-the-counter and prescription medicines only as told by your health care provider.  · Do not use any drugs or alcohol.  · Keep all follow-up visits as told by your health care provider. This is important. This includes continuing to work with therapists, health care providers, and support groups.  Contact a health care provider if:  · You cannot take your medicines as told.  · Your symptoms get worse.  · You have trouble resisting the urge to use drugs or alcohol.  Get help right away if:  · You have serious thoughts about hurting yourself or someone else.  · You have a relapse.  This information is not intended to replace advice given to you by your health care provider. Make sure you discuss any questions you have with your health care provider.  Document Released: 08/09/2006 Document Revised: 08/16/2017 Document Reviewed: 12/23/2015  [employer] Interactive Patient Education © 2017 [employer]        Chest Pain, Nonspecific  It is often hard to give a specific diagnosis for the cause of chest pain. There is always a chance that your pain could be related to something serious, like a heart attack or a blood clot in the lungs. You need to follow up with your caregiver for further evaluation. More lab tests or other studies such as X-rays, electrocardiography, stress testing, or cardiac imaging may be needed to find the cause of your pain.  Most of the time, nonspecific chest pain improves within 2 to 3 days with rest and mild pain medicine. For the next few days, avoid physical exertion or activities that bring on pain. Do not smoke. Avoid drinking alcohol. Call your caregiver for routine follow-up as advised.   SEEK IMMEDIATE MEDICAL CARE  IF:  · You develop increased chest pain or pain that radiates to the arm, neck, jaw, back, or abdomen.   · You develop shortness of breath, increased coughing, or you start coughing up blood.   · You have severe back or abdominal pain, nausea, or vomiting.   · You develop severe weakness, fainting, fever, or chills.   Document Released: 12/18/2006 Document Revised: 03/11/2013 Document Reviewed: 06/06/2008  [employer]® Patient Information ©2013 [employer].

## 2020-05-03 NOTE — PROGRESS NOTES
Assumed care for the pt; bedside shift report received; plan of care reviewed; sr up x2; bed alarm on; door open to view; pt is presently sitting up in bed talking to the nursing staff; no respiratory distress noted; no c/o pain.

## 2020-05-03 NOTE — DISCHARGE SUMMARY
"Discharge Summary    CHIEF COMPLAINT ON ADMISSION  Chief Complaint   Patient presents with   • Shortness of Breath   • Chest Pain   • Loss of Consciousness       Reason for Admission  EMS     Admission Date  5/1/2020    CODE STATUS  Full Code    HPI & HOSPITAL COURSE  60 y.o. male admitted 5/1/2020 after losing consciousness while using methamphetamine with some friends. Per report, his friend thought patient was having a cardiac arrest and did chest compressions. EMS were called, patient received narcan with minimal response.      He has chronic active Hepatitis C.      Troponin was 24, D-Dimer was negative, Covid-19 was negative, EKG was unremarkable. Chest X-ray reported \"asymmetric interstitial opacities lower lobe, right greater than left, which may represent asymmetric interstitial edema or pneumonia\". Blood pressure was elevated. He was afebrile with normal respiratory and heart rate. He was hypoxic on admission (71% on room air), improved to 99% with 6L/M oxygen mask. Urine drug screen was positive for opioids and amphetamines.     Patient is saturating 96% on room air. Feels comfortable. States he will completely quit Methamphetamine use. He feels ready to go home     Chest Pain  Patient denies having chest pain, states his back was hurting  Pain was most likely from chest compressions  D-Dimer was normal, chest x-ray showed interstitial edema vs. Pneumonia  Afebrile, hemodynamically stable, no leukocytosis. Procalcitonin was negative  Saturating 96% on room air    Hypertensive Urgency  BP stabilized. Continue home medications with PCP follow up in 1-2 weeks     Amphetamine abuse  Patient was using methamphetamine prior to arrival. States he will quit       Therefore, he is discharged in good and stable condition to home with close outpatient follow-up.    Physical Exam  Constitutional:       Appearance: Normal appearance.   HENT:      Head: Normocephalic and atraumatic.      Nose: Nose normal.   Eyes: "      Pupils: Pupils are equal, round, and reactive to light.   Neck:      Musculoskeletal: Normal range of motion.   Cardiovascular:      Rate and Rhythm: Normal rate.      Pulses: Normal pulses.   Pulmonary:      Effort: Pulmonary effort is normal.      Breath sounds: Normal breath sounds.   Abdominal:      Palpations: Abdomen is soft.   Musculoskeletal: Normal range of motion.      Right lower leg: No edema.      Left lower leg: No edema.   Skin:     General: Skin is warm.   Neurological:      General: No focal deficit present.      Mental Status: He is alert.   Psychiatric:         Mood and Affect: Mood normal.     Discharge Date  5/3/20    FOLLOW UP ITEMS POST DISCHARGE  PCP in 1-2 weeks    DISCHARGE DIAGNOSES  Principal Problem (Resolved):    Chest pain POA: Unknown  Active Problems:    Amphetamine abuse (HCC) POA: Unknown  Resolved Problems:    Normocytic anemia POA: Unknown    Hypertensive urgency POA: Unknown      FOLLOW UP  No future appointments.  Primary Care Physician    In 2 weeks  For post hospitalization visit      MEDICATIONS ON DISCHARGE     Medication List      CONTINUE taking these medications      Instructions   hydroCHLOROthiazide 25 MG Tabs  Commonly known as:  HYDRODIURIL   Take 25 mg by mouth every day.  Dose:  25 mg     lisinopril 30 MG tablet  Commonly known as:  PRINIVIL   Take 30 mg by mouth 2 Times a Day. TWICE DAILY  Dose:  30 mg            Allergies  Allergies   Allergen Reactions   • Bactrim [Sulfamethoxazole W-Trimethoprim] Swelling     Lost a testicle as a result of the reaction skin peeled   • Sulfa Drugs        DIET  Orders Placed This Encounter   Procedures   • Diet Order Cardiac     Standing Status:   Standing     Number of Occurrences:   1     Order Specific Question:   Diet:     Answer:   Cardiac [6]     Order Specific Question:   Miscellaneous modifications:     Answer:   No Decaf, No Caffeine(for test) [11]       ACTIVITY  As  tolerated.    CONSULTATIONS  None    PROCEDURES  None    LABORATORY  Lab Results   Component Value Date    SODIUM 137 05/03/2020    POTASSIUM 4.3 05/03/2020    CHLORIDE 101 05/03/2020    CO2 25 05/03/2020    GLUCOSE 101 (H) 05/03/2020    BUN 17 05/03/2020    CREATININE 0.78 05/03/2020        Lab Results   Component Value Date    WBC 11.8 (H) 05/03/2020    HEMOGLOBIN 12.4 (L) 05/03/2020    HEMATOCRIT 35.4 (L) 05/03/2020    PLATELETCT 176 05/03/2020        Total time of the discharge process exceeds 35 minutes.

## 2020-05-03 NOTE — PROGRESS NOTES
Assumed care of patient, bedside report received from KINGA Pickett. Updated on POC, call light within reach and fall precautions in place. Bed locked and in lowest position. Patient instructed to call for assistance before getting out of bed. All questions answered, no other needs at this time.

## 2020-05-03 NOTE — CARE PLAN
Problem: Communication  Goal: The ability to communicate needs accurately and effectively will improve  Outcome: PROGRESSING AS EXPECTED  Note: Pt is able to make his needs known; a/ox4.      Problem: Safety  Goal: Will remain free from injury  Outcome: PROGRESSING AS EXPECTED  Note: Call bell within reach; bed alarm on; sr up x2; up with one person standby assist; gait is steady.

## 2020-05-03 NOTE — CARE PLAN
Problem: Communication  Goal: The ability to communicate needs accurately and effectively will improve  Outcome: PROGRESSING AS EXPECTED   Patient is able to clearly and effectively communicate needs.     Problem: Safety  Goal: Will remain free from falls  Outcome: PROGRESSING AS EXPECTED  Bed locked and in lowest position. Bed alarm on. Treaded socks in use. Call light and belongings within reach. Patient educated to call for assistance. Patient verbalizes understanding. Hourly rounding in place.      Problem: Knowledge Deficit  Goal: Knowledge of disease process/condition, treatment plan, diagnostic tests, and medications will improve  Outcome: PROGRESSING AS EXPECTED   Discussed plan of care and medications with patient, patient verbalizes understanding.

## 2020-05-06 NOTE — DISCHARGE PLANNING
note:  Pt called in because he is looking for his wallet. Called security who [phone]

## 2021-03-15 DIAGNOSIS — Z23 NEED FOR VACCINATION: ICD-10-CM

## 2021-04-21 ENCOUNTER — APPOINTMENT (OUTPATIENT)
Dept: RADIOLOGY | Facility: MEDICAL CENTER | Age: 61
End: 2021-04-21
Attending: EMERGENCY MEDICINE
Payer: MEDICAID

## 2021-04-21 ENCOUNTER — HOSPITAL ENCOUNTER (EMERGENCY)
Facility: MEDICAL CENTER | Age: 61
End: 2021-04-21
Attending: EMERGENCY MEDICINE
Payer: MEDICAID

## 2021-04-21 VITALS
HEART RATE: 69 BPM | DIASTOLIC BLOOD PRESSURE: 95 MMHG | WEIGHT: 170 LBS | OXYGEN SATURATION: 95 % | TEMPERATURE: 95.7 F | HEIGHT: 72 IN | BODY MASS INDEX: 23.03 KG/M2 | RESPIRATION RATE: 19 BRPM | SYSTOLIC BLOOD PRESSURE: 158 MMHG

## 2021-04-21 DIAGNOSIS — R91.1 LUNG NODULE: ICD-10-CM

## 2021-04-21 DIAGNOSIS — R55 NEAR SYNCOPE: ICD-10-CM

## 2021-04-21 LAB
ALBUMIN SERPL BCP-MCNC: 4 G/DL (ref 3.2–4.9)
ALBUMIN/GLOB SERPL: 1.1 G/DL
ALP SERPL-CCNC: 74 U/L (ref 30–99)
ALT SERPL-CCNC: 33 U/L (ref 2–50)
ANION GAP SERPL CALC-SCNC: 7 MMOL/L (ref 7–16)
APTT PPP: 24 SEC (ref 24.7–36)
AST SERPL-CCNC: 38 U/L (ref 12–45)
BASOPHILS # BLD AUTO: 0.7 % (ref 0–1.8)
BASOPHILS # BLD: 0.04 K/UL (ref 0–0.12)
BILIRUB SERPL-MCNC: 0.6 MG/DL (ref 0.1–1.5)
BUN SERPL-MCNC: 20 MG/DL (ref 8–22)
CALCIUM SERPL-MCNC: 9.1 MG/DL (ref 8.5–10.5)
CHLORIDE SERPL-SCNC: 103 MMOL/L (ref 96–112)
CO2 SERPL-SCNC: 26 MMOL/L (ref 20–33)
CREAT SERPL-MCNC: 1.02 MG/DL (ref 0.5–1.4)
EKG IMPRESSION: NORMAL
EKG IMPRESSION: NORMAL
EOSINOPHIL # BLD AUTO: 0.24 K/UL (ref 0–0.51)
EOSINOPHIL NFR BLD: 4.5 % (ref 0–6.9)
ERYTHROCYTE [DISTWIDTH] IN BLOOD BY AUTOMATED COUNT: 42.4 FL (ref 35.9–50)
ETHANOL BLD-MCNC: <10.1 MG/DL (ref 0–10)
GLOBULIN SER CALC-MCNC: 3.7 G/DL (ref 1.9–3.5)
GLUCOSE SERPL-MCNC: 144 MG/DL (ref 65–99)
HCT VFR BLD AUTO: 38.6 % (ref 42–52)
HGB BLD-MCNC: 13.1 G/DL (ref 14–18)
IMM GRANULOCYTES # BLD AUTO: 0.01 K/UL (ref 0–0.11)
IMM GRANULOCYTES NFR BLD AUTO: 0.2 % (ref 0–0.9)
INR PPP: 1.01 (ref 0.87–1.13)
LACTATE BLD-SCNC: 1.3 MMOL/L (ref 0.5–2)
LIPASE SERPL-CCNC: 69 U/L (ref 11–82)
LYMPHOCYTES # BLD AUTO: 2.18 K/UL (ref 1–4.8)
LYMPHOCYTES NFR BLD: 40.4 % (ref 22–41)
MCH RBC QN AUTO: 32.7 PG (ref 27–33)
MCHC RBC AUTO-ENTMCNC: 33.9 G/DL (ref 33.7–35.3)
MCV RBC AUTO: 96.3 FL (ref 81.4–97.8)
MONOCYTES # BLD AUTO: 0.41 K/UL (ref 0–0.85)
MONOCYTES NFR BLD AUTO: 7.6 % (ref 0–13.4)
NEUTROPHILS # BLD AUTO: 2.51 K/UL (ref 1.82–7.42)
NEUTROPHILS NFR BLD: 46.6 % (ref 44–72)
NRBC # BLD AUTO: 0 K/UL
NRBC BLD-RTO: 0 /100 WBC
PLATELET # BLD AUTO: 216 K/UL (ref 164–446)
PMV BLD AUTO: 8.9 FL (ref 9–12.9)
POTASSIUM SERPL-SCNC: 3.7 MMOL/L (ref 3.6–5.5)
PROT SERPL-MCNC: 7.7 G/DL (ref 6–8.2)
PROTHROMBIN TIME: 13.6 SEC (ref 12–14.6)
RBC # BLD AUTO: 4.01 M/UL (ref 4.7–6.1)
SODIUM SERPL-SCNC: 136 MMOL/L (ref 135–145)
TROPONIN T SERPL-MCNC: 37 NG/L (ref 6–19)
TROPONIN T SERPL-MCNC: 38 NG/L (ref 6–19)
WBC # BLD AUTO: 5.4 K/UL (ref 4.8–10.8)

## 2021-04-21 PROCEDURE — 85025 COMPLETE CBC W/AUTO DIFF WBC: CPT

## 2021-04-21 PROCEDURE — 85730 THROMBOPLASTIN TIME PARTIAL: CPT

## 2021-04-21 PROCEDURE — 70450 CT HEAD/BRAIN W/O DYE: CPT

## 2021-04-21 PROCEDURE — 84484 ASSAY OF TROPONIN QUANT: CPT | Mod: 91

## 2021-04-21 PROCEDURE — 83690 ASSAY OF LIPASE: CPT

## 2021-04-21 PROCEDURE — 93005 ELECTROCARDIOGRAM TRACING: CPT

## 2021-04-21 PROCEDURE — 93005 ELECTROCARDIOGRAM TRACING: CPT | Performed by: EMERGENCY MEDICINE

## 2021-04-21 PROCEDURE — 36415 COLL VENOUS BLD VENIPUNCTURE: CPT

## 2021-04-21 PROCEDURE — 99284 EMERGENCY DEPT VISIT MOD MDM: CPT

## 2021-04-21 PROCEDURE — 700117 HCHG RX CONTRAST REV CODE 255: Performed by: EMERGENCY MEDICINE

## 2021-04-21 PROCEDURE — 83605 ASSAY OF LACTIC ACID: CPT

## 2021-04-21 PROCEDURE — 80053 COMPREHEN METABOLIC PANEL: CPT

## 2021-04-21 PROCEDURE — 74175 CTA ABDOMEN W/CONTRAST: CPT

## 2021-04-21 PROCEDURE — 85610 PROTHROMBIN TIME: CPT

## 2021-04-21 PROCEDURE — 82077 ASSAY SPEC XCP UR&BREATH IA: CPT

## 2021-04-21 RX ADMIN — IOHEXOL 100 ML: 350 INJECTION, SOLUTION INTRAVENOUS at 05:46

## 2021-04-21 ASSESSMENT — FIBROSIS 4 INDEX: FIB4 SCORE: 2.38

## 2021-04-21 NOTE — ED TRIAGE NOTES
"Chief Complaint   Patient presents with   • Nausea   • Dizziness   [facility] for the above the complaints. Pt states he's \"unsure what happened\" but that he feels nauseated and weak for over an hour. Pt received zofran en route with some relief. Pt drowsy upon arrival to ED, denies ETOH, admits to marijuana  "

## 2021-04-21 NOTE — DISCHARGE INSTRUCTIONS
Follow-up with primary care this week for reevaluation, to establish care, for medication management and close blood pressure monitoring.  Continued monitoring of lung nodules is encouraged as well.    Encourage nonalcoholic oral fluid hydration and balanced diet.    Return to the emergency department for recurrent symptoms, or for dizziness, syncope, vomiting, chest pain, shortness of breath, altered mental status, focal weakness, seizure or other new concerns.
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
## 2021-04-21 NOTE — ED NOTES
Pt provided with water and juice for PO challenge per ERP at this time. Upon successful consumption of fluids, pt to road test and may be discharged if not symptomatic

## 2021-09-14 ENCOUNTER — HOSPITAL ENCOUNTER (OUTPATIENT)
Dept: RADIOLOGY | Facility: MEDICAL CENTER | Age: 61
End: 2021-09-14
Attending: PHYSICIAN ASSISTANT
Payer: MEDICAID

## 2021-09-14 DIAGNOSIS — N23 KIDNEY PAIN: ICD-10-CM

## 2021-11-24 ENCOUNTER — HOSPITAL ENCOUNTER (EMERGENCY)
Facility: MEDICAL CENTER | Age: 61
End: 2021-11-24
Attending: EMERGENCY MEDICINE
Payer: MEDICAID

## 2021-11-24 ENCOUNTER — APPOINTMENT (OUTPATIENT)
Dept: RADIOLOGY | Facility: MEDICAL CENTER | Age: 61
End: 2021-11-24
Attending: EMERGENCY MEDICINE
Payer: MEDICAID

## 2021-11-24 VITALS
SYSTOLIC BLOOD PRESSURE: 180 MMHG | TEMPERATURE: 98.4 F | OXYGEN SATURATION: 96 % | HEART RATE: 70 BPM | WEIGHT: 170 LBS | RESPIRATION RATE: 16 BRPM | DIASTOLIC BLOOD PRESSURE: 104 MMHG | HEIGHT: 72 IN | BODY MASS INDEX: 23.03 KG/M2

## 2021-11-24 DIAGNOSIS — R19.7 DIARRHEA OF PRESUMED INFECTIOUS ORIGIN: ICD-10-CM

## 2021-11-24 DIAGNOSIS — R11.2 NON-INTRACTABLE VOMITING WITH NAUSEA, UNSPECIFIED VOMITING TYPE: ICD-10-CM

## 2021-11-24 DIAGNOSIS — E86.0 MILD DEHYDRATION: ICD-10-CM

## 2021-11-24 DIAGNOSIS — R10.9 ABDOMINAL CRAMPING: ICD-10-CM

## 2021-11-24 LAB
ALBUMIN SERPL BCP-MCNC: 4.4 G/DL (ref 3.2–4.9)
ALBUMIN/GLOB SERPL: 1.3 G/DL
ALP SERPL-CCNC: 70 U/L (ref 30–99)
ALT SERPL-CCNC: 31 U/L (ref 2–50)
ANION GAP SERPL CALC-SCNC: 10 MMOL/L (ref 7–16)
AST SERPL-CCNC: 32 U/L (ref 12–45)
BASOPHILS # BLD AUTO: 0.2 % (ref 0–1.8)
BASOPHILS # BLD: 0.02 K/UL (ref 0–0.12)
BILIRUB SERPL-MCNC: 0.4 MG/DL (ref 0.1–1.5)
BUN SERPL-MCNC: 29 MG/DL (ref 8–22)
CALCIUM SERPL-MCNC: 8.8 MG/DL (ref 8.5–10.5)
CHLORIDE SERPL-SCNC: 107 MMOL/L (ref 96–112)
CO2 SERPL-SCNC: 23 MMOL/L (ref 20–33)
CREAT SERPL-MCNC: 0.74 MG/DL (ref 0.5–1.4)
EOSINOPHIL # BLD AUTO: 0.15 K/UL (ref 0–0.51)
EOSINOPHIL NFR BLD: 1.6 % (ref 0–6.9)
ERYTHROCYTE [DISTWIDTH] IN BLOOD BY AUTOMATED COUNT: 42.5 FL (ref 35.9–50)
GLOBULIN SER CALC-MCNC: 3.4 G/DL (ref 1.9–3.5)
GLUCOSE SERPL-MCNC: 107 MG/DL (ref 65–99)
HCT VFR BLD AUTO: 38.7 % (ref 42–52)
HGB BLD-MCNC: 13 G/DL (ref 14–18)
IMM GRANULOCYTES # BLD AUTO: 0.03 K/UL (ref 0–0.11)
IMM GRANULOCYTES NFR BLD AUTO: 0.3 % (ref 0–0.9)
LIPASE SERPL-CCNC: 23 U/L (ref 11–82)
LYMPHOCYTES # BLD AUTO: 1.53 K/UL (ref 1–4.8)
LYMPHOCYTES NFR BLD: 16.4 % (ref 22–41)
MCH RBC QN AUTO: 31.7 PG (ref 27–33)
MCHC RBC AUTO-ENTMCNC: 33.6 G/DL (ref 33.7–35.3)
MCV RBC AUTO: 94.4 FL (ref 81.4–97.8)
MONOCYTES # BLD AUTO: 0.65 K/UL (ref 0–0.85)
MONOCYTES NFR BLD AUTO: 7 % (ref 0–13.4)
NEUTROPHILS # BLD AUTO: 6.93 K/UL (ref 1.82–7.42)
NEUTROPHILS NFR BLD: 74.5 % (ref 44–72)
NRBC # BLD AUTO: 0 K/UL
NRBC BLD-RTO: 0 /100 WBC
PLATELET # BLD AUTO: 219 K/UL (ref 164–446)
PMV BLD AUTO: 8.7 FL (ref 9–12.9)
POTASSIUM SERPL-SCNC: 4.1 MMOL/L (ref 3.6–5.5)
PROT SERPL-MCNC: 7.8 G/DL (ref 6–8.2)
RBC # BLD AUTO: 4.1 M/UL (ref 4.7–6.1)
SODIUM SERPL-SCNC: 140 MMOL/L (ref 135–145)
WBC # BLD AUTO: 9.3 K/UL (ref 4.8–10.8)

## 2021-11-24 PROCEDURE — 83690 ASSAY OF LIPASE: CPT

## 2021-11-24 PROCEDURE — 96372 THER/PROPH/DIAG INJ SC/IM: CPT

## 2021-11-24 PROCEDURE — 700117 HCHG RX CONTRAST REV CODE 255: Performed by: EMERGENCY MEDICINE

## 2021-11-24 PROCEDURE — 74177 CT ABD & PELVIS W/CONTRAST: CPT

## 2021-11-24 PROCEDURE — 36415 COLL VENOUS BLD VENIPUNCTURE: CPT

## 2021-11-24 PROCEDURE — 99285 EMERGENCY DEPT VISIT HI MDM: CPT

## 2021-11-24 PROCEDURE — 85025 COMPLETE CBC W/AUTO DIFF WBC: CPT

## 2021-11-24 PROCEDURE — 80053 COMPREHEN METABOLIC PANEL: CPT

## 2021-11-24 PROCEDURE — 700111 HCHG RX REV CODE 636 W/ 250 OVERRIDE (IP): Performed by: EMERGENCY MEDICINE

## 2021-11-24 RX ORDER — LOPERAMIDE HYDROCHLORIDE 2 MG/1
2 CAPSULE ORAL 4 TIMES DAILY PRN
Qty: 30 CAPSULE | Refills: 0 | Status: SHIPPED | OUTPATIENT
Start: 2021-11-24

## 2021-11-24 RX ORDER — DICYCLOMINE HCL 20 MG
20 TABLET ORAL EVERY 6 HOURS
Qty: 14 TABLET | Refills: 0 | Status: SHIPPED | OUTPATIENT
Start: 2021-11-24

## 2021-11-24 RX ORDER — DICYCLOMINE HYDROCHLORIDE 10 MG/ML
20 INJECTION INTRAMUSCULAR ONCE
Status: COMPLETED | OUTPATIENT
Start: 2021-11-24 | End: 2021-11-24

## 2021-11-24 RX ORDER — ONDANSETRON 4 MG/1
4 TABLET, ORALLY DISINTEGRATING ORAL EVERY 6 HOURS PRN
Qty: 8 TABLET | Refills: 0 | Status: SHIPPED | OUTPATIENT
Start: 2021-11-24

## 2021-11-24 RX ADMIN — IOHEXOL 100 ML: 350 INJECTION, SOLUTION INTRAVENOUS at 02:25

## 2021-11-24 RX ADMIN — DICYCLOMINE HYDROCHLORIDE 20 MG: 20 INJECTION, SOLUTION INTRAMUSCULAR at 02:46

## 2021-11-24 ASSESSMENT — FIBROSIS 4 INDEX: FIB4 SCORE: 1.93

## 2021-11-24 NOTE — ED NOTES
Discharge instructions discussed with pt, verbalized understanding. PIV placed. All belongings with pt upon leaving unit. Pt amb with steady gait to lobby.
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
## 2021-11-24 NOTE — ED TRIAGE NOTES
"Chief Complaint   Patient presents with   • Abdominal Pain     Per EMS report, pt has had lower abdominal pain x2 days with worsening tonight. pt having diarrhea dark in color. PTA EMS gave 4mg zofran and 100mcg fentanyl. pt report when he burps it has a \"sulfer taste\". Pt reports nausea.    • Diarrhea     Pt hooked up to monitor and in gown. Chart up for ERP.  "

## 2021-12-23 ENCOUNTER — APPOINTMENT (OUTPATIENT)
Dept: RADIOLOGY | Facility: MEDICAL CENTER | Age: 61
End: 2021-12-23
Payer: MEDICAID

## 2021-12-23 ENCOUNTER — HOSPITAL ENCOUNTER (EMERGENCY)
Facility: MEDICAL CENTER | Age: 61
End: 2021-12-23
Attending: EMERGENCY MEDICINE
Payer: MEDICAID

## 2021-12-23 ENCOUNTER — APPOINTMENT (OUTPATIENT)
Dept: RADIOLOGY | Facility: MEDICAL CENTER | Age: 61
End: 2021-12-23
Attending: EMERGENCY MEDICINE
Payer: MEDICAID

## 2021-12-23 VITALS
DIASTOLIC BLOOD PRESSURE: 103 MMHG | BODY MASS INDEX: 21.94 KG/M2 | OXYGEN SATURATION: 95 % | RESPIRATION RATE: 18 BRPM | TEMPERATURE: 97 F | HEIGHT: 72 IN | HEART RATE: 99 BPM | WEIGHT: 162 LBS | SYSTOLIC BLOOD PRESSURE: 175 MMHG

## 2021-12-23 DIAGNOSIS — S41.112A ARM LACERATION, LEFT, INITIAL ENCOUNTER: ICD-10-CM

## 2021-12-23 DIAGNOSIS — T14.8XXA STAB WOUND: ICD-10-CM

## 2021-12-23 PROCEDURE — 304999 HCHG REPAIR-SIMPLE/INTERMED LEVEL 1

## 2021-12-23 PROCEDURE — 303747 HCHG EXTRA SUTURE

## 2021-12-23 PROCEDURE — 700101 HCHG RX REV CODE 250: Performed by: EMERGENCY MEDICINE

## 2021-12-23 PROCEDURE — 304217 HCHG IRRIGATION SYSTEM

## 2021-12-23 PROCEDURE — 96365 THER/PROPH/DIAG IV INF INIT: CPT

## 2021-12-23 PROCEDURE — 305949 HCHG RED TRAUMA ACT PRE-NOTIFY NO CC

## 2021-12-23 PROCEDURE — 99284 EMERGENCY DEPT VISIT MOD MDM: CPT

## 2021-12-23 PROCEDURE — 700111 HCHG RX REV CODE 636 W/ 250 OVERRIDE (IP): Performed by: EMERGENCY MEDICINE

## 2021-12-23 PROCEDURE — 0002A HCHG PFIZER COVID ADMIN 2ND DOSE: CPT

## 2021-12-23 PROCEDURE — 99284 EMERGENCY DEPT VISIT MOD MDM: CPT | Performed by: SURGERY

## 2021-12-23 PROCEDURE — 91300 HCHG RX REV CODE 636 W/ 250 OVERRIDE (IP): CPT | Performed by: EMERGENCY MEDICINE

## 2021-12-23 RX ORDER — LIDOCAINE HYDROCHLORIDE AND EPINEPHRINE 10; 10 MG/ML; UG/ML
20 INJECTION, SOLUTION INFILTRATION; PERINEURAL ONCE
Status: COMPLETED | OUTPATIENT
Start: 2021-12-23 | End: 2021-12-23

## 2021-12-23 RX ORDER — CEFAZOLIN SODIUM 2 G/100ML
INJECTION, SOLUTION INTRAVENOUS
Status: COMPLETED | OUTPATIENT
Start: 2021-12-23 | End: 2021-12-23

## 2021-12-23 RX ADMIN — RNA INGREDIENT BNT-162B2 0.3 ML: 0.23 INJECTION, SUSPENSION INTRAMUSCULAR at 14:04

## 2021-12-23 RX ADMIN — CEFAZOLIN SODIUM 2 G: 2 INJECTION, SOLUTION INTRAVENOUS at 13:34

## 2021-12-23 RX ADMIN — LIDOCAINE HYDROCHLORIDE AND EPINEPHRINE 20 ML: 10; 10 INJECTION, SOLUTION INFILTRATION; PERINEURAL at 14:00
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
## 2021-12-23 NOTE — ED NOTES
Discharge teaching and paperwork provided regarding arm laceration and all questions/concerns answered. VSS, assessment stable and PIV removed. Given information regarding home care and reasons to follow up with ED or primary MD. Patient provided with COVID booster, vaccine card & education. Patient discharged to the care of partner and ambulatory out of the ED.
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
## 2021-12-23 NOTE — DISCHARGE INSTRUCTIONS
Follow-up with primary care or ED in 48 hours for reevaluation and wound check.  Follow-up with emergency department in 7 to 10 days for suture removal.    Keep wound clean, dry and intact.  Cleanse gently with warm water, soap, pat dry.  Avoid soaking wound in water.  Apply antibiotic ointment twice daily.  Keep covered while active.    Tylenol or ibuprofen for any discomfort.    Return to the emergency department for persistent or worsening pain, swelling, paresthesias or weakness, discoloration, bleeding or other drainage, fever or other new concerns.

## 2021-12-31 ENCOUNTER — HOSPITAL ENCOUNTER (EMERGENCY)
Facility: MEDICAL CENTER | Age: 61
End: 2021-12-31
Attending: EMERGENCY MEDICINE
Payer: MEDICAID

## 2021-12-31 ENCOUNTER — APPOINTMENT (OUTPATIENT)
Dept: RADIOLOGY | Facility: MEDICAL CENTER | Age: 61
End: 2021-12-31
Attending: EMERGENCY MEDICINE
Payer: MEDICAID

## 2021-12-31 VITALS
SYSTOLIC BLOOD PRESSURE: 185 MMHG | HEART RATE: 91 BPM | RESPIRATION RATE: 17 BRPM | BODY MASS INDEX: 22.51 KG/M2 | OXYGEN SATURATION: 97 % | TEMPERATURE: 97.8 F | DIASTOLIC BLOOD PRESSURE: 117 MMHG | HEIGHT: 72 IN | WEIGHT: 166.23 LBS

## 2021-12-31 DIAGNOSIS — T14.8XXA HEMATOMA: ICD-10-CM

## 2021-12-31 DIAGNOSIS — Z48.02 ENCOUNTER FOR REMOVAL OF SUTURES: ICD-10-CM

## 2021-12-31 LAB
ALBUMIN SERPL BCP-MCNC: 4 G/DL (ref 3.2–4.9)
ALBUMIN/GLOB SERPL: 1.2 G/DL
ALP SERPL-CCNC: 74 U/L (ref 30–99)
ALT SERPL-CCNC: 32 U/L (ref 2–50)
ANION GAP SERPL CALC-SCNC: 8 MMOL/L (ref 7–16)
AST SERPL-CCNC: 41 U/L (ref 12–45)
BASOPHILS # BLD AUTO: 0.6 % (ref 0–1.8)
BASOPHILS # BLD: 0.04 K/UL (ref 0–0.12)
BILIRUB SERPL-MCNC: 0.7 MG/DL (ref 0.1–1.5)
BUN SERPL-MCNC: 16 MG/DL (ref 8–22)
CALCIUM SERPL-MCNC: 9.3 MG/DL (ref 8.5–10.5)
CHLORIDE SERPL-SCNC: 103 MMOL/L (ref 96–112)
CO2 SERPL-SCNC: 27 MMOL/L (ref 20–33)
CREAT SERPL-MCNC: 0.75 MG/DL (ref 0.5–1.4)
EOSINOPHIL # BLD AUTO: 0.47 K/UL (ref 0–0.51)
EOSINOPHIL NFR BLD: 6.9 % (ref 0–6.9)
ERYTHROCYTE [DISTWIDTH] IN BLOOD BY AUTOMATED COUNT: 44.6 FL (ref 35.9–50)
GLOBULIN SER CALC-MCNC: 3.4 G/DL (ref 1.9–3.5)
GLUCOSE SERPL-MCNC: 93 MG/DL (ref 65–99)
HCT VFR BLD AUTO: 35.5 % (ref 42–52)
HGB BLD-MCNC: 11.8 G/DL (ref 14–18)
IMM GRANULOCYTES # BLD AUTO: 0.02 K/UL (ref 0–0.11)
IMM GRANULOCYTES NFR BLD AUTO: 0.3 % (ref 0–0.9)
LYMPHOCYTES # BLD AUTO: 2.11 K/UL (ref 1–4.8)
LYMPHOCYTES NFR BLD: 30.9 % (ref 22–41)
MCH RBC QN AUTO: 32 PG (ref 27–33)
MCHC RBC AUTO-ENTMCNC: 33.2 G/DL (ref 33.7–35.3)
MCV RBC AUTO: 96.2 FL (ref 81.4–97.8)
MONOCYTES # BLD AUTO: 0.7 K/UL (ref 0–0.85)
MONOCYTES NFR BLD AUTO: 10.3 % (ref 0–13.4)
NEUTROPHILS # BLD AUTO: 3.48 K/UL (ref 1.82–7.42)
NEUTROPHILS NFR BLD: 51 % (ref 44–72)
NRBC # BLD AUTO: 0 K/UL
NRBC BLD-RTO: 0 /100 WBC
PLATELET # BLD AUTO: 234 K/UL (ref 164–446)
PMV BLD AUTO: 8.9 FL (ref 9–12.9)
POTASSIUM SERPL-SCNC: 3.9 MMOL/L (ref 3.6–5.5)
PROT SERPL-MCNC: 7.4 G/DL (ref 6–8.2)
RBC # BLD AUTO: 3.69 M/UL (ref 4.7–6.1)
SODIUM SERPL-SCNC: 138 MMOL/L (ref 135–145)
WBC # BLD AUTO: 6.8 K/UL (ref 4.8–10.8)

## 2021-12-31 PROCEDURE — 80053 COMPREHEN METABOLIC PANEL: CPT

## 2021-12-31 PROCEDURE — 99283 EMERGENCY DEPT VISIT LOW MDM: CPT

## 2021-12-31 PROCEDURE — 700117 HCHG RX CONTRAST REV CODE 255: Performed by: EMERGENCY MEDICINE

## 2021-12-31 PROCEDURE — 85025 COMPLETE CBC W/AUTO DIFF WBC: CPT

## 2021-12-31 PROCEDURE — 73206 CT ANGIO UPR EXTRM W/O&W/DYE: CPT | Mod: LT

## 2021-12-31 RX ADMIN — IOHEXOL 100 ML: 350 INJECTION, SOLUTION INTRAVENOUS at 17:00

## 2021-12-31 ASSESSMENT — ENCOUNTER SYMPTOMS
SHORTNESS OF BREATH: 0
VOMITING: 0
FEVER: 0

## 2021-12-31 ASSESSMENT — FIBROSIS 4 INDEX: FIB4 SCORE: 1.65
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
## 2021-12-31 NOTE — ED TRIAGE NOTES
Chief Complaint   Patient presents with   • Suture Removal     x5 sutures in Lt arm. Pt has visible distension in bicep above suture site that Pt states wasn't there prior to having sutures placed.     Pt educated upon triage process and told to inform  staff of any changes in condition so that Pt may be reassessed. No further questions at this time. Pt sitting out in lobby.

## 2022-02-24 ENCOUNTER — HOSPITAL ENCOUNTER (EMERGENCY)
Facility: MEDICAL CENTER | Age: 62
End: 2022-02-24
Attending: EMERGENCY MEDICINE
Payer: MEDICAID

## 2022-02-24 VITALS
WEIGHT: 169.75 LBS | SYSTOLIC BLOOD PRESSURE: 148 MMHG | OXYGEN SATURATION: 99 % | RESPIRATION RATE: 18 BRPM | DIASTOLIC BLOOD PRESSURE: 88 MMHG | HEART RATE: 92 BPM | HEIGHT: 72 IN | TEMPERATURE: 97.2 F | BODY MASS INDEX: 22.99 KG/M2

## 2022-02-24 DIAGNOSIS — R21 RASH: ICD-10-CM

## 2022-02-24 DIAGNOSIS — B86 SCABIES: ICD-10-CM

## 2022-02-24 PROCEDURE — 99282 EMERGENCY DEPT VISIT SF MDM: CPT

## 2022-02-24 RX ORDER — PERMETHRIN 50 MG/G
CREAM TOPICAL
Qty: 60 G | Refills: 0 | Status: SHIPPED | OUTPATIENT
Start: 2022-02-24

## 2022-02-24 ASSESSMENT — FIBROSIS 4 INDEX: FIB4 SCORE: 1.98
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
## 2022-02-24 NOTE — ED TRIAGE NOTES
Pt amb to triage.  Chief Complaint   Patient presents with   • Bug Bite     Bites all over. Told by the shelter he has bed bugs. Sent here for treatment.

## 2022-02-24 NOTE — ED NOTES
Pt brought to shower and given soap and new clothes. Instructed to place old clothes in blue linen bag.

## 2022-02-28 ENCOUNTER — PHARMACY VISIT (OUTPATIENT)
Dept: PHARMACY | Facility: MEDICAL CENTER | Age: 62
End: 2022-02-28
Payer: COMMERCIAL

## 2022-02-28 PROCEDURE — RXMED WILLOW AMBULATORY MEDICATION CHARGE: Performed by: EMERGENCY MEDICINE

## 2022-03-05 ENCOUNTER — HOSPITAL ENCOUNTER (EMERGENCY)
Facility: MEDICAL CENTER | Age: 62
End: 2022-03-05
Attending: EMERGENCY MEDICINE
Payer: MEDICAID

## 2022-03-05 VITALS
HEART RATE: 92 BPM | TEMPERATURE: 98 F | DIASTOLIC BLOOD PRESSURE: 106 MMHG | RESPIRATION RATE: 20 BRPM | WEIGHT: 170.19 LBS | HEIGHT: 72 IN | OXYGEN SATURATION: 98 % | BODY MASS INDEX: 23.05 KG/M2 | SYSTOLIC BLOOD PRESSURE: 168 MMHG

## 2022-03-05 DIAGNOSIS — L03.90 CELLULITIS, UNSPECIFIED CELLULITIS SITE: ICD-10-CM

## 2022-03-05 PROCEDURE — 99283 EMERGENCY DEPT VISIT LOW MDM: CPT

## 2022-03-05 PROCEDURE — A9270 NON-COVERED ITEM OR SERVICE: HCPCS | Performed by: EMERGENCY MEDICINE

## 2022-03-05 PROCEDURE — 700102 HCHG RX REV CODE 250 W/ 637 OVERRIDE(OP): Performed by: EMERGENCY MEDICINE

## 2022-03-05 RX ORDER — CLINDAMYCIN HYDROCHLORIDE 300 MG/1
300 CAPSULE ORAL 3 TIMES DAILY
Qty: 30 CAPSULE | Refills: 0 | Status: SHIPPED | OUTPATIENT
Start: 2022-03-05 | End: 2022-03-06 | Stop reason: SDUPTHER

## 2022-03-05 RX ORDER — CLINDAMYCIN HYDROCHLORIDE 150 MG/1
300 CAPSULE ORAL ONCE
Status: COMPLETED | OUTPATIENT
Start: 2022-03-05 | End: 2022-03-05

## 2022-03-05 RX ADMIN — CLINDAMYCIN HYDROCHLORIDE 300 MG: 150 CAPSULE ORAL at 12:46

## 2022-03-05 ASSESSMENT — LIFESTYLE VARIABLES: DO YOU DRINK ALCOHOL: NO

## 2022-03-05 ASSESSMENT — ENCOUNTER SYMPTOMS
FEVER: 0
CHILLS: 0

## 2022-03-05 ASSESSMENT — FIBROSIS 4 INDEX: FIB4 SCORE: 1.98

## 2022-03-05 NOTE — ED NOTES
Assist RN: Patient provided discharge instructions. Patient verbalized understanding. Patient leaving ER in stable condition. Patient ambulatory with steady gait.

## 2022-03-05 NOTE — ED TRIAGE NOTES
Shravan Redd  62 y.o. male  Chief Complaint   Patient presents with   • Hand Injury     Right hand open blister on palm, abscess on medial right wrist. 5/10 constant pain       Pt ambulatory to triage with steady gait for above complaint.     Pt is alert and oriented, speaking in full sentences, follows commands and responds appropriately to questions. Resp are even and unlabored.     Pt placed in lobby. Pt educated on triage process. Pt encouraged to alert staff for any changes. This RN masked and in appropriate PPE during encounter.     Vitals:    03/05/22 1029   BP: (!) 164/103   Pulse: (!) 109   Resp: 14   Temp: 36 °C (96.8 °F)   SpO2: 97%
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
## 2022-03-05 NOTE — DISCHARGE INSTRUCTIONS
Your infection is on the wrist as we discussed given the location you will need to follow-up with orthopedics if symptoms do not improve.  If they acutely worsen despite antibiotics please return to the emergency department

## 2022-03-06 ENCOUNTER — HOSPITAL ENCOUNTER (EMERGENCY)
Facility: MEDICAL CENTER | Age: 62
End: 2022-03-06
Attending: EMERGENCY MEDICINE
Payer: MEDICAID

## 2022-03-06 VITALS
DIASTOLIC BLOOD PRESSURE: 80 MMHG | BODY MASS INDEX: 22.78 KG/M2 | RESPIRATION RATE: 16 BRPM | HEIGHT: 72 IN | TEMPERATURE: 97.4 F | OXYGEN SATURATION: 95 % | HEART RATE: 90 BPM | SYSTOLIC BLOOD PRESSURE: 138 MMHG | WEIGHT: 168.21 LBS

## 2022-03-06 DIAGNOSIS — L03.90 CELLULITIS, UNSPECIFIED CELLULITIS SITE: ICD-10-CM

## 2022-03-06 DIAGNOSIS — L03.113 CELLULITIS OF RIGHT UPPER EXTREMITY: ICD-10-CM

## 2022-03-06 PROCEDURE — 99282 EMERGENCY DEPT VISIT SF MDM: CPT

## 2022-03-06 RX ORDER — CLINDAMYCIN HYDROCHLORIDE 300 MG/1
300 CAPSULE ORAL 3 TIMES DAILY
Qty: 30 CAPSULE | Refills: 0 | Status: SHIPPED | OUTPATIENT
Start: 2022-03-06 | End: 2022-03-16

## 2022-03-06 ASSESSMENT — LIFESTYLE VARIABLES: DO YOU DRINK ALCOHOL: NO

## 2022-03-06 ASSESSMENT — FIBROSIS 4 INDEX: FIB4 SCORE: 1.98

## 2022-03-06 NOTE — ED TRIAGE NOTES
Ambulates to triage  Chief Complaint   Patient presents with   • Follow-Up     Was here yesterday for an abscess, the pharmacy would not fill his Rx for clindamyacin     Pain is increasing to his R wrist abscess.

## 2022-03-06 NOTE — ED NOTES
Called [facility] pharmacy, they said that pt's insurance not accepted [facility] he either need to go [facility] or [facility].   Changed pt's pharmacy to [facility] in [address] per pt's request. Informed erp.
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
## 2022-05-10 ENCOUNTER — APPOINTMENT (OUTPATIENT)
Dept: RADIOLOGY | Facility: MEDICAL CENTER | Age: 62
End: 2022-05-10
Attending: EMERGENCY MEDICINE
Payer: MEDICAID

## 2022-05-10 ENCOUNTER — HOSPITAL ENCOUNTER (EMERGENCY)
Facility: MEDICAL CENTER | Age: 62
End: 2022-05-10
Attending: EMERGENCY MEDICINE
Payer: MEDICAID

## 2022-05-10 VITALS
BODY MASS INDEX: 22.04 KG/M2 | HEIGHT: 72 IN | SYSTOLIC BLOOD PRESSURE: 166 MMHG | RESPIRATION RATE: 16 BRPM | OXYGEN SATURATION: 97 % | WEIGHT: 162.7 LBS | TEMPERATURE: 98.2 F | DIASTOLIC BLOOD PRESSURE: 88 MMHG | HEART RATE: 77 BPM

## 2022-05-10 DIAGNOSIS — T07.XXXA MULTIPLE CONTUSIONS: ICD-10-CM

## 2022-05-10 PROCEDURE — A9270 NON-COVERED ITEM OR SERVICE: HCPCS | Performed by: EMERGENCY MEDICINE

## 2022-05-10 PROCEDURE — 73502 X-RAY EXAM HIP UNI 2-3 VIEWS: CPT | Mod: LT

## 2022-05-10 PROCEDURE — 99283 EMERGENCY DEPT VISIT LOW MDM: CPT

## 2022-05-10 PROCEDURE — 71045 X-RAY EXAM CHEST 1 VIEW: CPT

## 2022-05-10 PROCEDURE — 700102 HCHG RX REV CODE 250 W/ 637 OVERRIDE(OP): Performed by: EMERGENCY MEDICINE

## 2022-05-10 RX ORDER — ACETAMINOPHEN 325 MG/1
650 TABLET ORAL ONCE
Status: COMPLETED | OUTPATIENT
Start: 2022-05-10 | End: 2022-05-10

## 2022-05-10 RX ADMIN — ACETAMINOPHEN 650 MG: 325 TABLET, FILM COATED ORAL at 09:51

## 2022-05-10 ASSESSMENT — FIBROSIS 4 INDEX: FIB4 SCORE: 1.98

## 2022-05-10 ASSESSMENT — PAIN DESCRIPTION - PAIN TYPE: TYPE: ACUTE PAIN

## 2022-05-10 NOTE — ED NOTES
Patient ambulated to T-1 without incident. Patient changed into gown and placed on monitor. Patient oriented to care area. Chart up for ERP.
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
## 2022-05-10 NOTE — ED NOTES
Discharge orders received. AVS provided and explained to patient. All questions answered. Patient AOx4 and ambulatory. No IV placed during ED visit. Patient discharged to self without incident.
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
## 2022-06-01 ENCOUNTER — APPOINTMENT (OUTPATIENT)
Dept: RADIOLOGY | Facility: MEDICAL CENTER | Age: 62
End: 2022-06-01
Attending: EMERGENCY MEDICINE
Payer: MEDICAID

## 2022-06-01 ENCOUNTER — HOSPITAL ENCOUNTER (EMERGENCY)
Facility: MEDICAL CENTER | Age: 62
End: 2022-06-01
Attending: EMERGENCY MEDICINE
Payer: MEDICAID

## 2022-06-01 VITALS
HEIGHT: 72 IN | BODY MASS INDEX: 22.07 KG/M2 | TEMPERATURE: 99.1 F | RESPIRATION RATE: 18 BRPM | HEART RATE: 77 BPM | SYSTOLIC BLOOD PRESSURE: 141 MMHG | WEIGHT: 162.92 LBS | OXYGEN SATURATION: 96 % | DIASTOLIC BLOOD PRESSURE: 91 MMHG

## 2022-06-01 DIAGNOSIS — J06.9 UPPER RESPIRATORY TRACT INFECTION, UNSPECIFIED TYPE: ICD-10-CM

## 2022-06-01 LAB
FLUAV RNA SPEC QL NAA+PROBE: NEGATIVE
FLUBV RNA SPEC QL NAA+PROBE: NEGATIVE
RSV RNA SPEC QL NAA+PROBE: NEGATIVE
SARS-COV-2 RNA RESP QL NAA+PROBE: NOTDETECTED
SPECIMEN SOURCE: NORMAL

## 2022-06-01 PROCEDURE — 71045 X-RAY EXAM CHEST 1 VIEW: CPT

## 2022-06-01 PROCEDURE — 0241U HCHG SARS-COV-2 COVID-19 NFCT DS RESP RNA 4 TRGT MIC: CPT

## 2022-06-01 PROCEDURE — 99283 EMERGENCY DEPT VISIT LOW MDM: CPT | Mod: 25

## 2022-06-01 PROCEDURE — C9803 HOPD COVID-19 SPEC COLLECT: HCPCS | Performed by: EMERGENCY MEDICINE

## 2022-06-01 RX ORDER — IBUPROFEN 800 MG/1
800 TABLET ORAL EVERY 8 HOURS PRN
Qty: 9 TABLET | Refills: 0 | Status: SHIPPED | OUTPATIENT
Start: 2022-06-01 | End: 2022-06-04

## 2022-06-01 RX ORDER — BENZONATATE 100 MG/1
100 CAPSULE ORAL 3 TIMES DAILY PRN
Qty: 21 CAPSULE | Refills: 0 | Status: SHIPPED | OUTPATIENT
Start: 2022-06-01 | End: 2022-06-08

## 2022-06-01 ASSESSMENT — FIBROSIS 4 INDEX: FIB4 SCORE: 1.98

## 2022-06-01 NOTE — ED PROVIDER NOTES
ED Provider Note    Scribed for Pamella Holloway M.D. by Triston Candelaria. 6/1/2022  3:42 PM    Primary care provider: Augie De La Paz  Means of arrival: Walk in  History obtained from: Patient  History limited by: None    CHIEF COMPLAINT  Chief Complaint   Patient presents with   • Nasal Congestion     X1.5 weeks   • Cough     With green sputum.       HPI  Shravan Redd is a 62 y.o. male who presents for evaluation of nasal congestion with green discharge onset one week ago. He admits to associated symptoms of cough with green phlegm, fatigue, headaches, muscle aches, chest congestion, runny nose, and shortness of breath but denies blood in cough, chest pain, vomiting, or diarrhea fevers, chills. . He had increased coughing at night. Patient is currently living at the shelter and has been taking Advil but states he cannot afford a decongestant. No alleviating factors were reported. . Patient is unsure if he got his second COVID-19 vaccination but he is not boosted. He got his flu shot. Patient is around sick individuals regularly since he is living at the shelter.  He patient occasionally smokes. He does not drink alcohol. He has no history of diagnosed asthma or heart conditions.     PPE Note: I personally donned PPE for all patient encounters during this visit, with an N95 respirator mask and gloves.      Scribe remained outside the patient's room and did not have any contact with the patient for the duration of patient encounter.     REVIEW OF SYSTEMS  Pertinent positives include: nasal congestion, cough with green sputum, fatigue, headaches, muscle aches, chest congestion, runny nose, shortness of breath.   Pertinent negatives include no: blood in cough, vomiting, or diarrhea.    See HPI for further details. All other systems are negative.    PAST MEDICAL HISTORY   has a past medical history of Dental disorder, Emphysema of lung (HCC), Heart burn, Hepatitis C, Hiatus hernia syndrome, Hypertension, and  "Pneumonia (1998).    FAMILY HISTORY  Family History   Problem Relation Age of Onset   • Stroke Mother    • Hypertension Mother    • Hypertension Maternal Grandmother    • Stroke Maternal Grandmother    • Diabetes Maternal Grandmother        SOCIAL HISTORY  Social History     Tobacco Use   • Smoking status: Current Every Day Smoker     Packs/day: 0.25     Years: 20.00     Pack years: 5.00     Types: Cigarettes   • Smokeless tobacco: Never Used   Vaping Use   • Vaping Use: Never used   Substance Use Topics   • Alcohol use: Not Currently     Comment: \"hasn't had a drink in years\"   • Drug use: Yes     Types: Marijuana     Comment: THC. Hx of meth      Social History     Substance and Sexual Activity   Drug Use Yes   • Types: Marijuana    Comment: THC. Hx of meth       SURGICAL HISTORY  Past Surgical History:   Procedure Laterality Date   • INGUINAL HERNIA REPAIR Right 2/5/2019    Procedure: INGUINAL HERNIA REPAIR;  Surgeon: Jh Rowe M.D.;  Location: SURGERY SAME DAY [facility];  Service: General   • OTHER      orif jaw   • TESTICLE EXPLORATION      one testicle removed       CURRENT MEDICATIONS  Home Medications     Reviewed by César Patterson R.N. (Registered Nurse) on 06/01/22 at 1739  Med List Status: Not Addressed   Medication Last Dose Status   dicyclomine (BENTYL) 20 MG Tab  Active   hydroCHLOROthiazide (HYDRODIURIL) 25 MG Tab  Active   lisinopril (PRINIVIL) 30 MG tablet  Active   loperamide (IMODIUM) 2 MG Cap  Active   ondansetron (ZOFRAN ODT) 4 MG TABLET DISPERSIBLE  Active   permethrin (ELIMITE) 5 % Cream  Active                ALLERGIES  Allergies   Allergen Reactions   • Bactrim [Sulfamethoxazole W-Trimethoprim] Swelling     Lost a testicle as a result of the reaction skin peeled   • Sulfamethoxazole-Trimethoprim Rash     Skin shedding, per patient    • Sulfa Drugs        PHYSICAL EXAM  VITAL SIGNS: BP (!) 165/98   Pulse 88   Temp 36.4 °C (97.6 °F) (Temporal)   Resp 18   Ht 1.829 m (6')   Wt " 73.9 kg (162 lb 14.7 oz)   SpO2 98%   BMI 22.10 kg/m²      Constitutional: Well developed, well nourished; No acute distress; Non-toxic appearance.   HENT: Dry mucous membranes, right TM obscured by cerumen, left TM clear, clear nasal discharge without obvious purulence, Normocephalic, atraumatic; Bilateral external ears normal; No erythema or exudates in the posterior oropharynx.  Eyes: PERRL, EOMI, Conjunctiva normal. No discharge.   Neck:  Supple, nontender midline; No stridor; No nuchal rigidity.   Lymphatic: No cervical lymphadenopathy noted.   Cardiovascular: Regular rate and rhythm without murmurs, rubs, or gallop.   Thorax & Lungs: Slightly decreased breath sounds throughout, breath sounds clear to auscultation bilaterally without wheezing, rales or rhonchi. Nontender chest wall. No crepitus or subcutaneous air  Abdomen: Soft, nontender, bowel sounds normal. No obvious masses; No pulsatile masses; no rebound, guarding, or peritoneal signs.   Skin: Good color; warm and dry without rash or petechia.  Back: Nontender, No CVA tenderness.   Extremities: Distal radial, dorsalis pedis, posterior tibial pulses are equal bilaterally; No edema; Nontender calves or saphenous, No cyanosis, No clubbing.   Musculoskeletal: Good range of motion in all major joints. No tenderness to palpation or major deformities noted.   Neurologic: Alert & oriented x 4, clear speech.     LABS/RADIOLOGY/PROCEDURES  Results for orders placed or performed during the hospital encounter of 06/01/22   CoV-2, FLU A/B, and RSV by PCR (2-4 Hours CEPHEID) : Collect NP swab in VTM    Specimen: Respirate   Result Value Ref Range    Influenza virus A RNA Negative Negative    Influenza virus B, PCR Negative Negative    RSV, PCR Negative Negative    SARS-CoV-2 by PCR NotDetected     SARS-CoV-2 Source NP Swab          All labs reviewed by me.    DX-CHEST-PORTABLE (1 VIEW)   Final Result      No radiographic evidence of acute cardiopulmonary process.           The radiologist's interpretation of all radiological studies have been reviewed by me.      COURSE & MEDICAL DECISION MAKING  Pertinent Labs & Imaging studies reviewed. (See chart for details)    3:42 PM - Patient seen and examined at bedside. Discussed plan of care, including obtaining labs and images. Patient agrees to the plan of care.  Ordered for DX-Chest and labs to evaluate his symptoms.     5:26 PM - I reevaluated the patient at bedside. I discussed the patient's diagnostic study results which show negative flu and COVID-19. I discussed plan for discharge and follow up as outlined below. The patient is stable for discharge at this time and will return for any new or worsening symptoms. Patient verbalizes understanding and support with my plan for discharge.     Patient presents to the ER with complaint of green nasal discharge and coughing of green phlegm over the last 1 week.  No chest pain.  No shortness of breath.  No hemoptysis.  No fevers or chills.  He is well-appearing overall.  He is mostly concerned because he is blowing out green nasal discharge.  He is COVID, flu and RSV negative.  Chest x-ray is normal.  No evidence for pneumonia.  He is well-appearing.  I suspect he has a viral upper respiratory syndrome.  I do not think he needs antibiotics.  He has been encouraged to stop smoking.  I have prescribed him Tessalon for the cough since cough seems to get worse at night.  This time I think patient is safe and stable for outpatient management discharge home.  Has been given strict return precautions and discharge instructions for upper respiratory infection and he understands treatment plan and follow-up.     The patient will return for new or worsening symptoms and is stable at the time of discharge.    The patient is referred to a primary physician for blood pressure management, diabetic screening, and for all other preventative health concerns.    DISPOSITION:  Patient will be discharged  home in stable condition.    FOLLOW UP:  Augie De La Paz  [address] [facility] [address]  [phone]    Schedule an appointment as soon as possible for a visit in 2 days  If symptoms worsen return to ER      OUTPATIENT MEDICATIONS:  New Prescriptions    BENZONATATE (TESSALON) 100 MG CAP    Take 1 Capsule by mouth 3 times a day as needed for Cough for up to 7 days.    IBUPROFEN (MOTRIN) 800 MG TAB    Take 1 Tablet by mouth every 8 hours as needed for Moderate Pain for up to 3 days.         FINAL IMPRESSION  1. Upper respiratory tract infection, unspecified type Acute         I, Triston Candelaria (Scribe), am scribing for, and in the presence of, Pamella Holloway M.D..    Electronically signed by: Triston Candelaria ([place]), 6/1/2022    IPamella M.D. personally performed the services described in this documentation, as scribed by Triston Candelaria in my presence, and it is both accurate and complete.    This dictation has been created using voice recognition software. The accuracy of the dictation is limited by the abilities of the software. I expect there may be some errors of grammar and possibly content. I made every attempt to manually correct the errors within my dictation. However, errors related to voice recognition software may still exist and should be interpreted within the appropriate context.    The note accurately reflects work and decisions made by me.  Pamella Holloway M.D.  6/2/2022  12:15 AM

## 2022-06-01 NOTE — ED NOTES
Patient called from [facility] [place] and ambulated with steady gate to [place] [id number].

## 2022-06-02 NOTE — ED NOTES
Provided pt with written and verbal instructions regarding follow-up, activity, and RX. All questions answered and patient verbalized understanding. Pt ambulated out of unit with steady gait.

## 2022-06-02 NOTE — DISCHARGE INSTRUCTIONS
Return to the ER for any worsening cough, shortness of breath, chest pain, coughing of rust colored phlegm or blood, dizziness or lightheadedness, fevers over 100.4, shaking chills, vomiting, diarrhea, shortness of breath with exertion, or for any concerns.    Follow-up with your primary care physician within the next 1 to 2 days.  Please call for appointment.

## 2023-07-21 ENCOUNTER — HOSPITAL ENCOUNTER (OUTPATIENT)
Dept: RADIOLOGY | Facility: MEDICAL CENTER | Age: 63
End: 2023-07-21
Attending: NURSE PRACTITIONER
Payer: MEDICARE

## 2023-07-21 ENCOUNTER — HOSPITAL ENCOUNTER (EMERGENCY)
Facility: MEDICAL CENTER | Age: 63
End: 2023-07-21
Payer: MEDICARE

## 2023-07-21 VITALS
RESPIRATION RATE: 14 BRPM | BODY MASS INDEX: 22.96 KG/M2 | WEIGHT: 169.53 LBS | HEIGHT: 72 IN | SYSTOLIC BLOOD PRESSURE: 172 MMHG | TEMPERATURE: 97.4 F | HEART RATE: 81 BPM | DIASTOLIC BLOOD PRESSURE: 95 MMHG | OXYGEN SATURATION: 96 %

## 2023-07-21 DIAGNOSIS — S76.011A STRAIN OF HIP ADDUCTOR MUSCLE, RIGHT, INITIAL ENCOUNTER: ICD-10-CM

## 2023-07-21 DIAGNOSIS — M54.16 LUMBAR RADICULOPATHY: ICD-10-CM

## 2023-07-21 PROCEDURE — 302449 STATCHG TRIAGE ONLY (STATISTIC)

## 2023-07-21 PROCEDURE — 72100 X-RAY EXAM L-S SPINE 2/3 VWS: CPT

## 2023-07-21 PROCEDURE — 73502 X-RAY EXAM HIP UNI 2-3 VIEWS: CPT | Mod: RT

## 2023-07-21 ASSESSMENT — FIBROSIS 4 INDEX: FIB4 SCORE: 1.81

## 2024-01-13 ENCOUNTER — HOSPITAL ENCOUNTER (EMERGENCY)
Facility: MEDICAL CENTER | Age: 64
End: 2024-01-13
Attending: EMERGENCY MEDICINE
Payer: MEDICARE

## 2024-01-13 ENCOUNTER — APPOINTMENT (OUTPATIENT)
Dept: RADIOLOGY | Facility: MEDICAL CENTER | Age: 64
End: 2024-01-13
Attending: EMERGENCY MEDICINE
Payer: MEDICARE

## 2024-01-13 VITALS
TEMPERATURE: 98.7 F | RESPIRATION RATE: 16 BRPM | SYSTOLIC BLOOD PRESSURE: 154 MMHG | OXYGEN SATURATION: 97 % | DIASTOLIC BLOOD PRESSURE: 81 MMHG | WEIGHT: 175 LBS | HEIGHT: 72 IN | BODY MASS INDEX: 23.7 KG/M2 | HEART RATE: 79 BPM

## 2024-01-13 DIAGNOSIS — S83.92XA SPRAIN OF LEFT KNEE, UNSPECIFIED LIGAMENT, INITIAL ENCOUNTER: ICD-10-CM

## 2024-01-13 PROCEDURE — A9270 NON-COVERED ITEM OR SERVICE: HCPCS | Mod: UD | Performed by: EMERGENCY MEDICINE

## 2024-01-13 PROCEDURE — 700102 HCHG RX REV CODE 250 W/ 637 OVERRIDE(OP): Mod: UD | Performed by: EMERGENCY MEDICINE

## 2024-01-13 PROCEDURE — 73562 X-RAY EXAM OF KNEE 3: CPT | Mod: LT

## 2024-01-13 PROCEDURE — 99284 EMERGENCY DEPT VISIT MOD MDM: CPT

## 2024-01-13 RX ORDER — IBUPROFEN 600 MG/1
600 TABLET ORAL ONCE
Status: COMPLETED | OUTPATIENT
Start: 2024-01-13 | End: 2024-01-13

## 2024-01-13 RX ADMIN — IBUPROFEN 600 MG: 600 TABLET, FILM COATED ORAL at 01:23

## 2024-01-13 NOTE — ED TRIAGE NOTES
"Chief Complaint   Patient presents with    Knee Pain     BIB EMS for left knee pain pt twisted his knee while walking and dancing. Pt report hearing a \"pop.\" Pt reports tenderness on the knee as well. Pt was able to ambulate with assistance on scene. Pt has his right ankle covered when asked if pt has an injury, pt states it is a prior injury from football. [employer].        65 y/o M WC to triage for above complaint.     Pt is alert and oriented, speaking in full sentences, follows commands and responds appropriately to questions. NAD. Resp are even and unlabored.      Pt placed in lobby. Pt educated on triage process. Pt encouraged to alert staff for any changes.     Patient and staff wearing appropriate PPE    /79   Pulse 70   Temp 36.2 °C (97.2 °F) (Temporal)   Resp 16   Ht 1.829 m (6')   Wt 79.4 kg (175 lb)   SpO2 100%   BMI 23.73 kg/m²    "

## 2024-01-13 NOTE — ED PROVIDER NOTES
"ED Provider Note    CHIEF COMPLAINT  Chief Complaint   Patient presents with    Knee Pain     BIB EMS for left knee pain pt twisted his knee while walking and dancing. Pt report hearing a \"pop.\" Pt reports tenderness on the knee as well. Pt was able to ambulate with assistance on scene. Pt has his right ankle covered when asked if pt has an injury, pt states it is a prior injury from football. [employer].        EXTERNAL RECORDS REVIEWED  Other no records available for review    HPI/ROS  LIMITATION TO HISTORY   Select: : None  OUTSIDE HISTORIAN(S):  None    Shravan Redd is a 64 y.o. male who presents to the emergency department by ambulance for left knee pain.  Patient states he used \"speed.\"  Patient states he twisted it while dancing prior to arrival.  [place] a pop.  Pain with range of motion and weightbearing but tolerates otherwise.  Denies fall.  Denies other trauma or injury.  Occasions taken for discomfort prior to arrival.    PAST MEDICAL HISTORY   Hypertension    SURGICAL HISTORY  patient denies any surgical history    FAMILY HISTORY  History reviewed. No pertinent family history.    SOCIAL HISTORY  Social History     Tobacco Use    Smoking status: Some Days     Types: Cigarettes    Smokeless tobacco: Never   Vaping Use    Vaping Use: Never used   Substance and Sexual Activity    Alcohol use: Not Currently    Drug use: Not Currently    Sexual activity: Not on file       CURRENT MEDICATIONS  Home Medications    **Home medications have not yet been reviewed for this encounter**     Lisinopril    ALLERGIES  Allergies   Allergen Reactions    Bactrim [Sulfamethoxazole W-Trimethoprim]        PHYSICAL EXAM  VITAL SIGNS: BP (!) 157/80   Pulse 93   Temp 37.1 °C (98.8 °F) (Temporal)   Resp 18   Ht 1.829 m (6')   Wt 79.4 kg (175 lb)   SpO2 92%   BMI 23.73 kg/m²    Pulse ox interpretation: I interpret this pulse ox as normal.  Constitutional: Alert in no apparent distress.  HENT: Normocephalic, atraumatic. Bilateral " external ears normal, Nose normal.   Eyes: Conjunctiva normal.   Neck: Normal range of motion  Cardiovascular: Normal peripheral perfusion.  Thorax & Lungs: Nonlabored respirations  Skin: Warm, Dry  Musculoskeletal: Tenderness to palpation left medial knee without swelling, effusion, crepitus or deformity.  Active range of motion, flexion extension limited only by mild discomfort.  No tenderness of fibular head.  2+ DP.  Sensation intact light touch distally.  Otherwise good range of motion in all major joints. No major deformities noted.   Neurologic: Alert and orient x 4.  Psychiatric: Flat affect.  Cooperative.    DIAGNOSTIC STUDIES / PROCEDURES    RADIOLOGY  I have independently interpreted the diagnostic imaging associated with this visit and am waiting the final reading from the radiologist.   My preliminary interpretation is as follows:   Left knee x-ray: No fracture or dislocation.  No significant effusion    Radiologist interpretation:   DX-KNEE 3 VIEWS LEFT   Final Result         1.  No acute traumatic bony injury.   2.  Tricompartmental degenerative changes of the knee.            COURSE & MEDICAL DECISION MAKING    ED Observation Status? No; Patient does not meet criteria for ED Observation.     INITIAL ASSESSMENT, COURSE AND PLAN  Care Narrative:  ED evaluation for left knee pain most suggestive of sprain.  X-ray negative for fracture or dislocation.  CMS intact distally.  Cannot exclude internal derangement or ligamentous, meniscal injury.  Patient encouraged to follow-up with primary care or orthopedics next week of symptoms persist.  Until then, NSAIDs for swelling or discomfort, Ace wrap for comfort and support.  To advance weightbearing as tolerated.    HTN/IDDM FOLLOW UP:  The patient has known hypertension and is being followed by their primary care doctor    ADDITIONAL PROBLEM LIST  Hypertension -states compliance with lisinopril, primary care follow-up for ongoing monitoring  recommended    DISPOSITION AND DISCUSSIONS    Discussion of management with other [facility] or appropriate source(s): None     Decision tools and prescription drugs considered including, but not limited to: Pain Medications NSAIDs appropriate in the setting .    The patient is stable for discharge home, anticipatory guidance provided, continue lisinopril as previously prescribed, Motrin for swelling or discomfort, close follow-up is encouraged and strict return instructions have been discussed. Patient is agreeable to the disposition and plan.      FINAL DIAGNOSIS  1. Sprain of left knee, unspecified ligament, initial encounter           Electronically signed by: Doretha Huang D.O., 1/13/2024 1:02 AM

## 2024-01-13 NOTE — ED NOTES
ACE applied to LLE (knee). Pt ambulatory up to bathroom and back to bed without assistance from staff; gait steady

## 2024-01-13 NOTE — ED NOTES
Break RN:  Patient discharged home per ERP.  Discharge teaching and education discussed with patient. POC discussed.   Patient verbalized understanding of discharge teaching and education. No other questions at this time.     VSS. Patient alert and oriented. Patient arranged ride for self. Able to ambulate off unit safely with steady gait.

## 2024-01-13 NOTE — DISCHARGE INSTRUCTIONS
Follow-up with primary care or orthopedics next week for reevaluation and further workup/imaging/MRI if pain persist.    Weightbearing as tolerated.  Ace wrap to left knee for comfort and support.    Rest, ice, elevation as needed for swelling or discomfort.    Ibuprofen, 600 mg every 6 hours as needed for discomfort.    Return to the emergency department for persistent or worsening pain, swelling, discoloration, paresthesias or other new concerns.

## (undated) DEVICE — DRESSING TRANSPARENT FILM TEGADERM 4 X 4.75" (50EA/BX)"

## (undated) DEVICE — GLOVE SZ 7 BIOGEL PI MICRO - PF LF (50PR/BX 4BX/CA)

## (undated) DEVICE — TUBING CLEARLINK DUO-VENT - C-FLO (48EA/CA)

## (undated) DEVICE — ELECTRODE 850 FOAM ADHESIVE - HYDROGEL RADIOTRNSPRNT (50/PK)

## (undated) DEVICE — DRAPE LAPAROTOMY T SHEET - (12EA/CA)

## (undated) DEVICE — HEAD HOLDER JUNIOR/ADULT

## (undated) DEVICE — GLOVE BIOGEL INDICATOR SZ 8 SURGICAL PF LTX - (50/BX 4BX/CA)

## (undated) DEVICE — KIT  I.V. START (100EA/CA)

## (undated) DEVICE — TUBE CONNECTING SUCTION - CLEAR PLASTIC STERILE 72 IN (50EA/CA)

## (undated) DEVICE — SUTURE 4-0 MONOCRYL PLUS PS-2 - 27 INCH (36/BX)

## (undated) DEVICE — DRAIN PENROSE STERILE 1/4 X - 18 IN  (25EA/BX)

## (undated) DEVICE — GLOVE BIOGEL SZ 6.5 SURGICAL PF LTX (50PR/BX 4BX/CA)

## (undated) DEVICE — SPONGE GAUZESTER 4 X 4 4PLY - (128PK/CA)

## (undated) DEVICE — CANISTER SUCTION 3000ML MECHANICAL FILTER AUTO SHUTOFF MEDI-VAC NONSTERILE LF DISP  (40EA/CA)

## (undated) DEVICE — PACK MINOR BASIN - (2EA/CA)

## (undated) DEVICE — ELECTRODE DUAL RETURN W/ CORD - (50/PK)

## (undated) DEVICE — NEPTUNE 4 PORT MANIFOLD - (20/PK)

## (undated) DEVICE — CHLORAPREP 26 ML APPLICATOR - ORANGE TINT(25/CA)

## (undated) DEVICE — CANISTER SUCTION RIGID RED 1500CC (40EA/CA)

## (undated) DEVICE — GLOVE BIOGEL SZ 7.5 SURGICAL PF LTX - (50PR/BX 4BX/CA)

## (undated) DEVICE — KIT ANESTHESIA W/CIRCUIT & 3/LT BAG W/FILTER (20EA/CA)

## (undated) DEVICE — LACTATED RINGERS INJ 1000 ML - (14EA/CA 60CA/PF)

## (undated) DEVICE — SUTURE 2-0 VICRYL PLUS SH - 27 INCH (36/BX)

## (undated) DEVICE — SET EXTENSION WITH 2 PORTS (48EA/CA) ***PART #2C8610 IS A SUBSTITUTE*****

## (undated) DEVICE — SPONGE GAUZE STER 4X4 8-PL - (2/PK 50PK/BX 12BX/CS)

## (undated) DEVICE — SUCTION INSTRUMENT YANKAUER BULBOUS TIP W/O VENT (50EA/CA)

## (undated) DEVICE — CATHETER IV 20 GA X 1-1/4 ---SURG.& SDS ONLY--- (50EA/BX)

## (undated) DEVICE — MASK ANESTHESIA ADULT  - (100/CA)

## (undated) DEVICE — SUTURE 0 ETHIBOND MO6 C/R - (12/BX) 8-18 INCH ETHICON

## (undated) DEVICE — PROTECTOR ULNA NERVE - (36PR/CA)

## (undated) DEVICE — SUTURE 3-0 VICRYL PLUS SH - 8X 18 INCH (12/BX)

## (undated) DEVICE — GLOVE BIOGEL SZ 8 SURGICAL PF LTX - (50PR/BX 4BX/CA)

## (undated) DEVICE — SODIUM CHL IRRIGATION 0.9% 1000ML (12EA/CA)

## (undated) DEVICE — SENSOR SPO2 NEO LNCS ADHESIVE (20/BX) SEE USER NOTES

## (undated) DEVICE — SET LEADWIRE 5 LEAD BEDSIDE DISPOSABLE ECG (1SET OF 5/EA)